# Patient Record
Sex: FEMALE | Race: WHITE | NOT HISPANIC OR LATINO | Employment: OTHER | URBAN - NONMETROPOLITAN AREA
[De-identification: names, ages, dates, MRNs, and addresses within clinical notes are randomized per-mention and may not be internally consistent; named-entity substitution may affect disease eponyms.]

---

## 2023-04-12 DIAGNOSIS — J06.9 VIRAL UPPER RESPIRATORY TRACT INFECTION: Primary | ICD-10-CM

## 2023-04-12 RX ORDER — PREDNISONE 20 MG/1
TABLET ORAL
COMMUNITY
End: 2023-04-12 | Stop reason: SDUPTHER

## 2023-04-12 RX ORDER — PREDNISONE 20 MG/1
TABLET ORAL
Qty: 18 TABLET | Refills: 0 | Status: SHIPPED | OUTPATIENT
Start: 2023-04-12 | End: 2023-06-23 | Stop reason: SDUPTHER

## 2023-04-12 RX ORDER — AZITHROMYCIN 500 MG/1
500 TABLET, FILM COATED ORAL DAILY
COMMUNITY
Start: 2022-10-22 | End: 2023-04-12 | Stop reason: SDUPTHER

## 2023-04-12 RX ORDER — AZITHROMYCIN 500 MG/1
500 TABLET, FILM COATED ORAL DAILY
Qty: 7 TABLET | Refills: 7 | Status: SHIPPED | OUTPATIENT
Start: 2023-04-12 | End: 2023-06-23 | Stop reason: SDUPTHER

## 2023-05-02 DIAGNOSIS — J43.2 CENTRILOBULAR EMPHYSEMA (MULTI): Primary | ICD-10-CM

## 2023-05-02 RX ORDER — ALBUTEROL SULFATE 90 UG/1
AEROSOL, METERED RESPIRATORY (INHALATION)
Qty: 18 G | Refills: 2 | Status: SHIPPED | OUTPATIENT
Start: 2023-05-02 | End: 2023-06-12 | Stop reason: SDUPTHER

## 2023-05-05 DIAGNOSIS — I10 PRIMARY HYPERTENSION: Primary | ICD-10-CM

## 2023-05-05 RX ORDER — METOPROLOL TARTRATE 50 MG/1
TABLET ORAL
Qty: 180 TABLET | Refills: 2 | Status: SHIPPED | OUTPATIENT
Start: 2023-05-05 | End: 2024-03-01 | Stop reason: ALTCHOICE

## 2023-06-07 DIAGNOSIS — J41.0 SIMPLE CHRONIC BRONCHITIS (MULTI): ICD-10-CM

## 2023-06-07 RX ORDER — FLUTICASONE FUROATE, UMECLIDINIUM BROMIDE AND VILANTEROL TRIFENATATE 100; 62.5; 25 UG/1; UG/1; UG/1
POWDER RESPIRATORY (INHALATION)
Qty: 28 EACH | Refills: 3 | Status: ON HOLD | OUTPATIENT
Start: 2023-06-07 | End: 2023-12-28 | Stop reason: ALTCHOICE

## 2023-06-12 ENCOUNTER — TELEPHONE (OUTPATIENT)
Dept: PRIMARY CARE | Facility: CLINIC | Age: 83
End: 2023-06-12
Payer: MEDICARE

## 2023-06-12 DIAGNOSIS — J43.2 CENTRILOBULAR EMPHYSEMA (MULTI): ICD-10-CM

## 2023-06-12 RX ORDER — ALBUTEROL SULFATE 90 UG/1
2 AEROSOL, METERED RESPIRATORY (INHALATION)
Qty: 54 G | Refills: 3 | Status: SHIPPED | OUTPATIENT
Start: 2023-06-12 | End: 2024-01-12

## 2023-06-12 NOTE — TELEPHONE ENCOUNTER
Bailey from [facility] pharmacy called for 90 day refill on Albuterol.  Currently only getting 16 days at a time. Thank You

## 2023-06-23 DIAGNOSIS — J43.2 CENTRILOBULAR EMPHYSEMA (MULTI): Primary | ICD-10-CM

## 2023-06-23 DIAGNOSIS — J06.9 VIRAL UPPER RESPIRATORY TRACT INFECTION: ICD-10-CM

## 2023-06-23 RX ORDER — PREDNISONE 20 MG/1
TABLET ORAL
Qty: 18 TABLET | Refills: 0 | Status: SHIPPED | OUTPATIENT
Start: 2023-06-23 | End: 2023-07-18 | Stop reason: ALTCHOICE

## 2023-06-23 RX ORDER — AZITHROMYCIN 500 MG/1
500 TABLET, FILM COATED ORAL DAILY
Qty: 7 TABLET | Refills: 7 | Status: SHIPPED | OUTPATIENT
Start: 2023-06-23 | End: 2023-07-18 | Stop reason: ALTCHOICE

## 2023-06-23 NOTE — PROGRESS NOTES
Pt called through answering service to say that her copd is acting up and requested antibiotic and steroid. Asked pt how short of breath she is and she said she couldn't walk more than 40 ft. Advised pt if that so would recommend ER. Pt declined. Sent meds but advised if worsens recommend ER.

## 2023-08-01 DIAGNOSIS — J43.2 CENTRILOBULAR EMPHYSEMA (MULTI): Primary | ICD-10-CM

## 2023-08-01 RX ORDER — BUDESONIDE AND FORMOTEROL FUMARATE DIHYDRATE 80; 4.5 UG/1; UG/1
2 AEROSOL RESPIRATORY (INHALATION)
Qty: 3 EACH | Refills: 11 | Status: SHIPPED | OUTPATIENT
Start: 2023-08-01

## 2023-08-10 ENCOUNTER — OFFICE VISIT (OUTPATIENT)
Dept: PRIMARY CARE | Facility: CLINIC | Age: 83
End: 2023-08-10
Payer: MEDICARE

## 2023-08-10 VITALS
HEIGHT: 62 IN | BODY MASS INDEX: 20.61 KG/M2 | HEART RATE: 76 BPM | WEIGHT: 112 LBS | DIASTOLIC BLOOD PRESSURE: 79 MMHG | SYSTOLIC BLOOD PRESSURE: 118 MMHG

## 2023-08-10 DIAGNOSIS — E21.0 PRIMARY HYPERPARATHYROIDISM (MULTI): ICD-10-CM

## 2023-08-10 DIAGNOSIS — R94.6 ABNORMAL THYROID FUNCTION TEST: ICD-10-CM

## 2023-08-10 DIAGNOSIS — G62.0 NEUROPATHY DUE TO CHEMOTHERAPEUTIC DRUG (MULTI): Primary | ICD-10-CM

## 2023-08-10 DIAGNOSIS — T45.1X5A NEUROPATHY DUE TO CHEMOTHERAPEUTIC DRUG (MULTI): Primary | ICD-10-CM

## 2023-08-10 DIAGNOSIS — J41.0 SIMPLE CHRONIC BRONCHITIS (MULTI): ICD-10-CM

## 2023-08-10 DIAGNOSIS — Z13.220 SCREENING FOR LIPID DISORDERS: ICD-10-CM

## 2023-08-10 DIAGNOSIS — G63 NEUROPATHY ASSOCIATED WITH CANCER (MULTI): ICD-10-CM

## 2023-08-10 DIAGNOSIS — E46 PROTEIN-CALORIE MALNUTRITION, UNSPECIFIED SEVERITY (MULTI): ICD-10-CM

## 2023-08-10 DIAGNOSIS — C80.1 NEUROPATHY ASSOCIATED WITH CANCER (MULTI): ICD-10-CM

## 2023-08-10 DIAGNOSIS — R53.83 OTHER FATIGUE: ICD-10-CM

## 2023-08-10 DIAGNOSIS — I73.9 PERIPHERAL VASCULAR DISEASE (CMS-HCC): ICD-10-CM

## 2023-08-10 PROBLEM — K21.9 GERD (GASTROESOPHAGEAL REFLUX DISEASE): Status: ACTIVE | Noted: 2023-08-10

## 2023-08-10 PROBLEM — R91.1 LUNG NODULE: Status: ACTIVE | Noted: 2023-08-10

## 2023-08-10 PROBLEM — M81.0 SENILE OSTEOPOROSIS: Status: ACTIVE | Noted: 2020-11-02

## 2023-08-10 PROBLEM — F41.9 ANXIETY: Status: ACTIVE | Noted: 2023-08-10

## 2023-08-10 PROCEDURE — 99214 OFFICE O/P EST MOD 30 MIN: CPT | Performed by: INTERNAL MEDICINE

## 2023-08-10 PROCEDURE — 1159F MED LIST DOCD IN RCRD: CPT | Performed by: INTERNAL MEDICINE

## 2023-08-10 PROCEDURE — 1160F RVW MEDS BY RX/DR IN RCRD: CPT | Performed by: INTERNAL MEDICINE

## 2023-08-10 PROCEDURE — 1036F TOBACCO NON-USER: CPT | Performed by: INTERNAL MEDICINE

## 2023-08-10 ASSESSMENT — ENCOUNTER SYMPTOMS
CHILLS: 0
SHORTNESS OF BREATH: 0
COUGH: 0
APPETITE CHANGE: 0
SORE THROAT: 0
WHEEZING: 0
WEAKNESS: 0
NAUSEA: 0
BACK PAIN: 0
FATIGUE: 0
ACTIVITY CHANGE: 0
NUMBNESS: 0
SINUS PAIN: 0
SINUS PRESSURE: 0
ABDOMINAL DISTENTION: 0
DIARRHEA: 0
VOMITING: 0

## 2023-08-10 ASSESSMENT — PATIENT HEALTH QUESTIONNAIRE - PHQ9
1. LITTLE INTEREST OR PLEASURE IN DOING THINGS: NOT AT ALL
SUM OF ALL RESPONSES TO PHQ9 QUESTIONS 1 AND 2: 0
2. FEELING DOWN, DEPRESSED OR HOPELESS: NOT AT ALL

## 2023-08-10 NOTE — PROGRESS NOTES
"Subjective   Patient ID: Melissa Goodwin is a 83 y.o. female who presents for Follow-up (6 month).  HPI  Patient is here today for 6 mo follow up   Patient had called through the answering service due to significant sob, had send in her medication but told her that if she was that short of breath she should go to the hospital, she had declined. She reports that she is feeling a little better.     Review of Systems   Constitutional:  Negative for activity change, appetite change, chills and fatigue.   HENT:  Negative for congestion, postnasal drip, sinus pressure, sinus pain and sore throat.    Respiratory:  Negative for cough, shortness of breath and wheezing.    Cardiovascular:  Negative for chest pain and leg swelling.   Gastrointestinal:  Negative for abdominal distention, diarrhea, nausea and vomiting.   Musculoskeletal:  Negative for back pain.   Neurological:  Negative for weakness and numbness.       Objective   /79 (BP Location: Right arm, Patient Position: Sitting, BP Cuff Size: Adult)   Pulse 76   Ht 1.575 m (5' 2\")   Wt 50.8 kg (112 lb)   BMI 20.49 kg/m²     Physical Exam  Constitutional:       General: She is not in acute distress.     Appearance: Normal appearance.   HENT:      Head: Normocephalic.      Nose: Nose normal.      Mouth/Throat:      Mouth: Mucous membranes are dry.      Pharynx: No oropharyngeal exudate.   Eyes:      General:         Right eye: No discharge.         Left eye: No discharge.      Extraocular Movements: Extraocular movements intact.      Pupils: Pupils are equal, round, and reactive to light.   Cardiovascular:      Rate and Rhythm: Normal rate and regular rhythm.      Heart sounds: No murmur heard.     No gallop.   Pulmonary:      Effort: Pulmonary effort is normal. No respiratory distress.      Breath sounds: Normal breath sounds. No wheezing.   Musculoskeletal:         General: No swelling. Normal range of motion.   Skin:     General: Skin is warm and dry.      " Coloration: Skin is not jaundiced.   Neurological:      General: No focal deficit present.      Mental Status: She is alert and oriented to person, place, and time.      Cranial Nerves: No cranial nerve deficit.   Psychiatric:         Mood and Affect: Mood normal.         Behavior: Behavior normal.           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 HTN, controlled   - continue hctz 25mg po daily   - continue metoprolol 50mg po daily      2 COPD, stable   - continue trelegy , advised her to hold the symbicort   albuterol as needed      3 . Osteoporosis   - on ibandronate      4. Colon cancer s/p resection  - she does not want q6 mo CT scans for surveillance   - no issues, not actively following with hemonc   - declines cea bloodwork     5. 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

## 2023-08-15 ENCOUNTER — LAB (OUTPATIENT)
Dept: LAB | Facility: LAB | Age: 83
End: 2023-08-15
Payer: MEDICARE

## 2023-08-15 DIAGNOSIS — Z13.220 SCREENING FOR LIPID DISORDERS: ICD-10-CM

## 2023-08-15 DIAGNOSIS — R94.6 ABNORMAL THYROID FUNCTION TEST: ICD-10-CM

## 2023-08-15 DIAGNOSIS — E21.0 PRIMARY HYPERPARATHYROIDISM (MULTI): ICD-10-CM

## 2023-08-15 DIAGNOSIS — R53.83 OTHER FATIGUE: ICD-10-CM

## 2023-08-15 LAB
ALANINE AMINOTRANSFERASE (SGPT) (U/L) IN SER/PLAS: 10 U/L (ref 7–45)
ALBUMIN (G/DL) IN SER/PLAS: 4.4 G/DL (ref 3.4–5)
ALKALINE PHOSPHATASE (U/L) IN SER/PLAS: 64 U/L (ref 33–136)
ANION GAP IN SER/PLAS: 11 MMOL/L (ref 10–20)
ASPARTATE AMINOTRANSFERASE (SGOT) (U/L) IN SER/PLAS: 25 U/L (ref 9–39)
BILIRUBIN TOTAL (MG/DL) IN SER/PLAS: 1.2 MG/DL (ref 0–1.2)
CALCIUM (MG/DL) IN SER/PLAS: 10.3 MG/DL (ref 8.6–10.3)
CARBON DIOXIDE, TOTAL (MMOL/L) IN SER/PLAS: 30 MMOL/L (ref 21–32)
CHLORIDE (MMOL/L) IN SER/PLAS: 101 MMOL/L (ref 98–107)
CHOLESTEROL (MG/DL) IN SER/PLAS: 98 MG/DL (ref 0–199)
CHOLESTEROL IN HDL (MG/DL) IN SER/PLAS: 72 MG/DL
CHOLESTEROL/HDL RATIO: 1.4
COBALAMIN (VITAMIN B12) (PG/ML) IN SER/PLAS: 389 PG/ML (ref 211–911)
CREATININE (MG/DL) IN SER/PLAS: 0.65 MG/DL (ref 0.5–1.05)
GFR FEMALE: 87 ML/MIN/1.73M2
GLUCOSE (MG/DL) IN SER/PLAS: 91 MG/DL (ref 74–99)
LDL: 19 MG/DL (ref 0–99)
POTASSIUM (MMOL/L) IN SER/PLAS: 4.2 MMOL/L (ref 3.5–5.3)
PROTEIN TOTAL: 6.4 G/DL (ref 6.4–8.2)
SODIUM (MMOL/L) IN SER/PLAS: 138 MMOL/L (ref 136–145)
THYROTROPIN (MIU/L) IN SER/PLAS BY DETECTION LIMIT <= 0.05 MIU/L: 4.57 MIU/L (ref 0.44–3.98)
THYROXINE (T4) FREE (NG/DL) IN SER/PLAS: 1.01 NG/DL (ref 0.61–1.12)
TRIGLYCERIDE (MG/DL) IN SER/PLAS: 33 MG/DL (ref 0–149)
UREA NITROGEN (MG/DL) IN SER/PLAS: 12 MG/DL (ref 6–23)
VLDL: 7 MG/DL (ref 0–40)

## 2023-08-15 PROCEDURE — 82607 VITAMIN B-12: CPT

## 2023-08-15 PROCEDURE — 80053 COMPREHEN METABOLIC PANEL: CPT

## 2023-08-15 PROCEDURE — 84439 ASSAY OF FREE THYROXINE: CPT

## 2023-08-15 PROCEDURE — 80061 LIPID PANEL: CPT

## 2023-08-15 PROCEDURE — 83970 ASSAY OF PARATHORMONE: CPT

## 2023-08-15 PROCEDURE — 36415 COLL VENOUS BLD VENIPUNCTURE: CPT

## 2023-08-15 PROCEDURE — 84443 ASSAY THYROID STIM HORMONE: CPT

## 2023-08-16 LAB — PARATHYRIN INTACT (PG/ML) IN SER/PLAS: 222.1 PG/ML (ref 18.5–88)

## 2023-08-20 DIAGNOSIS — M81.0 SENILE OSTEOPOROSIS: Primary | ICD-10-CM

## 2023-08-21 RX ORDER — IBANDRONATE SODIUM 150 MG/1
150 TABLET, FILM COATED ORAL
Qty: 3 TABLET | Refills: 1 | Status: SHIPPED | OUTPATIENT
Start: 2023-08-21 | End: 2024-03-01 | Stop reason: ALTCHOICE

## 2023-09-21 ENCOUNTER — OFFICE VISIT (OUTPATIENT)
Dept: PRIMARY CARE | Facility: CLINIC | Age: 83
End: 2023-09-21
Payer: MEDICARE

## 2023-09-21 VITALS
HEART RATE: 66 BPM | DIASTOLIC BLOOD PRESSURE: 80 MMHG | BODY MASS INDEX: 21.16 KG/M2 | WEIGHT: 115 LBS | HEIGHT: 62 IN | SYSTOLIC BLOOD PRESSURE: 127 MMHG

## 2023-09-21 DIAGNOSIS — Z01.818 PRE-OP EVALUATION: Primary | ICD-10-CM

## 2023-09-21 DIAGNOSIS — J41.0 SIMPLE CHRONIC BRONCHITIS (MULTI): ICD-10-CM

## 2023-09-21 PROCEDURE — 99214 OFFICE O/P EST MOD 30 MIN: CPT | Performed by: INTERNAL MEDICINE

## 2023-09-21 PROCEDURE — 1160F RVW MEDS BY RX/DR IN RCRD: CPT | Performed by: INTERNAL MEDICINE

## 2023-09-21 PROCEDURE — 1159F MED LIST DOCD IN RCRD: CPT | Performed by: INTERNAL MEDICINE

## 2023-09-21 PROCEDURE — 1036F TOBACCO NON-USER: CPT | Performed by: INTERNAL MEDICINE

## 2023-09-21 RX ORDER — BUDESONIDE, GLYCOPYRROLATE, AND FORMOTEROL FUMARATE 160; 9; 4.8 UG/1; UG/1; UG/1
2 AEROSOL, METERED RESPIRATORY (INHALATION)
Qty: 10.7 G | Refills: 0 | Status: SHIPPED | OUTPATIENT
Start: 2023-09-21 | End: 2023-10-11

## 2023-09-21 ASSESSMENT — ENCOUNTER SYMPTOMS
WEAKNESS: 0
SINUS PAIN: 0
ABDOMINAL DISTENTION: 0
FATIGUE: 0
SORE THROAT: 0
WHEEZING: 0
COUGH: 0
NAUSEA: 0
APPETITE CHANGE: 0
NUMBNESS: 0
BACK PAIN: 0
DIARRHEA: 0
SINUS PRESSURE: 0
CHILLS: 0
SHORTNESS OF BREATH: 0
VOMITING: 0
ACTIVITY CHANGE: 0

## 2023-09-21 NOTE — PROGRESS NOTES
"Subjective   Patient ID: Melissa Goodwin is a 83 y.o. female who presents for Pre-op Exam.  HPI  Patient is here today for pre-op clearance for left cataract surgery by Dr Pitts.   Patient has had surgeries in the past wiouth any issues with anesthesia.   Patient reports that she is feeling well.   Patient has greater than 4METS without sob or chest pain.   She had complete bloodwork back in August 23.     Review of Systems   Constitutional:  Negative for activity change, appetite change, chills and fatigue.   HENT:  Negative for congestion, postnasal drip, sinus pressure, sinus pain and sore throat.    Respiratory:  Negative for cough, shortness of breath and wheezing.    Cardiovascular:  Negative for chest pain and leg swelling.   Gastrointestinal:  Negative for abdominal distention, diarrhea, nausea and vomiting.   Musculoskeletal:  Negative for back pain.   Neurological:  Negative for weakness and numbness.       Objective   /80 (BP Location: Right arm, Patient Position: Sitting, BP Cuff Size: Adult)   Pulse 66   Ht 1.575 m (5' 2\")   Wt 52.2 kg (115 lb)   BMI 21.03 kg/m²     Physical Exam  Constitutional:       General: She is not in acute distress.     Appearance: Normal appearance. She is not toxic-appearing.   HENT:      Head: Normocephalic and atraumatic.      Nose: Nose normal.      Mouth/Throat:      Mouth: Mucous membranes are moist.      Pharynx: Oropharynx is clear.   Eyes:      Extraocular Movements: Extraocular movements intact.      Conjunctiva/sclera: Conjunctivae normal.      Pupils: Pupils are equal, round, and reactive to light.   Cardiovascular:      Rate and Rhythm: Normal rate and regular rhythm.      Heart sounds: No murmur heard.     No friction rub. No gallop.   Pulmonary:      Effort: Pulmonary effort is normal.      Breath sounds: Normal breath sounds.   Abdominal:      General: Bowel sounds are normal. There is no distension.      Palpations: Abdomen is soft. There is no mass. "      Tenderness: There is no abdominal tenderness. There is no guarding.   Musculoskeletal:         General: No swelling. Normal range of motion.      Cervical back: Normal range of motion.   Skin:     General: Skin is warm and dry.   Neurological:      General: No focal deficit present.      Mental Status: She is alert and oriented to person, place, and time.   Psychiatric:         Mood and Affect: Mood normal.         Thought Content: Thought content normal.         Judgment: Judgment normal.           Assessment/Plan   Problem List Items Addressed This Visit       Simple chronic bronchitis (CMS/HCC)    Relevant Medications    budesonide-glycopyr-formoterol (Breztri Aerosphere) 160-9-4.8 mcg/actuation HFA aerosol inhaler     Other Visit Diagnoses       Pre-op evaluation    -  Primary    Relevant Orders    ECG 12 Lead          Pre- op clearance   - cmp normal in 8.23    Will order EKG   - has had surgeries without issues on anesthesia   - can walk grater than 4 mets WO any chest pain or sob so no need for further cardiac testing   - copd has been stable without exacerbations in the last 6 mo   - addend when see results   ADDENDUM Reviewed pts EKG, NSR, right bundle branch block  PT is low risk and may proceed with planned cataract surgery     2. COPD   Wants to try breztri instead of trelegy, will see if its covered     Final diagnoses:   [Z01.818] Pre-op evaluation   [J41.0] Simple chronic bronchitis (CMS/HCC)

## 2023-10-05 ENCOUNTER — PREP FOR PROCEDURE (OUTPATIENT)
Dept: OPERATING ROOM | Facility: HOSPITAL | Age: 83
End: 2023-10-05
Payer: MEDICARE

## 2023-10-05 DIAGNOSIS — H25.812 COMBINED FORMS OF AGE-RELATED CATARACT OF LEFT EYE: Primary | ICD-10-CM

## 2023-10-05 RX ORDER — KETOROLAC TROMETHAMINE 5 MG/ML
1 SOLUTION OPHTHALMIC
Status: CANCELLED | OUTPATIENT
Start: 2023-10-12 | End: 2023-10-12

## 2023-10-05 RX ORDER — PREDNISOLONE ACETATE 10 MG/ML
1 SUSPENSION/ DROPS OPHTHALMIC ONCE
Status: DISCONTINUED | OUTPATIENT
Start: 2023-10-12 | End: 2023-10-05 | Stop reason: HOSPADM

## 2023-10-05 RX ORDER — POVIDONE-IODINE 5 %
1 SOLUTION, NON-ORAL OPHTHALMIC (EYE) ONCE
Status: CANCELLED | OUTPATIENT
Start: 2023-10-12

## 2023-10-05 RX ORDER — MOXIFLOXACIN 5 MG/ML
1 SOLUTION/ DROPS OPHTHALMIC ONCE
Status: DISCONTINUED | OUTPATIENT
Start: 2023-10-12 | End: 2023-10-05 | Stop reason: HOSPADM

## 2023-10-05 RX ORDER — TETRACAINE HYDROCHLORIDE 5 MG/ML
1 SOLUTION OPHTHALMIC ONCE
Status: CANCELLED | OUTPATIENT
Start: 2023-10-12

## 2023-10-10 ENCOUNTER — PREP FOR PROCEDURE (OUTPATIENT)
Dept: OPERATING ROOM | Facility: HOSPITAL | Age: 83
End: 2023-10-10
Payer: MEDICARE

## 2023-10-10 RX ORDER — MOXIFLOXACIN 5 MG/ML
1 SOLUTION/ DROPS OPHTHALMIC ONCE
Status: CANCELLED | OUTPATIENT
Start: 2023-10-12

## 2023-10-10 RX ORDER — MOXIFLOXACIN 5 MG/ML
1 SOLUTION/ DROPS OPHTHALMIC ONCE
Status: DISCONTINUED | OUTPATIENT
Start: 2023-10-12 | End: 2023-10-10 | Stop reason: ENTERED-IN-ERROR

## 2023-10-10 RX ORDER — PREDNISOLONE ACETATE 10 MG/ML
1 SUSPENSION/ DROPS OPHTHALMIC ONCE
Status: CANCELLED | OUTPATIENT
Start: 2023-10-12

## 2023-10-10 RX ORDER — PREDNISOLONE ACETATE 10 MG/ML
1 SUSPENSION/ DROPS OPHTHALMIC ONCE
Status: DISCONTINUED | OUTPATIENT
Start: 2023-10-12 | End: 2023-10-10 | Stop reason: ENTERED-IN-ERROR

## 2023-10-11 ENCOUNTER — PRE-ADMISSION TESTING (OUTPATIENT)
Dept: PREADMISSION TESTING | Facility: HOSPITAL | Age: 83
End: 2023-10-11
Payer: MEDICARE

## 2023-10-11 ENCOUNTER — ANESTHESIA EVENT (OUTPATIENT)
Dept: OPERATING ROOM | Facility: HOSPITAL | Age: 83
End: 2023-10-11
Payer: MEDICARE

## 2023-10-11 VITALS — BODY MASS INDEX: 20.85 KG/M2 | WEIGHT: 114 LBS

## 2023-10-12 ENCOUNTER — HOSPITAL ENCOUNTER (OUTPATIENT)
Facility: HOSPITAL | Age: 83
Setting detail: OUTPATIENT SURGERY
Discharge: HOME | End: 2023-10-12
Attending: OPHTHALMOLOGY | Admitting: OPHTHALMOLOGY
Payer: MEDICARE

## 2023-10-12 ENCOUNTER — ANESTHESIA (OUTPATIENT)
Dept: OPERATING ROOM | Facility: HOSPITAL | Age: 83
End: 2023-10-12
Payer: MEDICARE

## 2023-10-12 VITALS
HEART RATE: 60 BPM | HEIGHT: 62 IN | SYSTOLIC BLOOD PRESSURE: 138 MMHG | OXYGEN SATURATION: 95 % | TEMPERATURE: 98.9 F | WEIGHT: 114.64 LBS | DIASTOLIC BLOOD PRESSURE: 81 MMHG | RESPIRATION RATE: 16 BRPM | BODY MASS INDEX: 21.1 KG/M2

## 2023-10-12 DIAGNOSIS — H25.812 COMBINED FORMS OF AGE-RELATED CATARACT OF LEFT EYE: Primary | ICD-10-CM

## 2023-10-12 PROBLEM — I10 HTN (HYPERTENSION): Status: ACTIVE | Noted: 2023-10-12

## 2023-10-12 PROCEDURE — C1780 LENS, INTRAOCULAR (NEW TECH): HCPCS | Performed by: OPHTHALMOLOGY

## 2023-10-12 PROCEDURE — 2500000004 HC RX 250 GENERAL PHARMACY W/ HCPCS (ALT 636 FOR OP/ED): Performed by: ANESTHESIOLOGY

## 2023-10-12 PROCEDURE — 2500000001 HC RX 250 WO HCPCS SELF ADMINISTERED DRUGS (ALT 637 FOR MEDICARE OP): Performed by: OPHTHALMOLOGY

## 2023-10-12 PROCEDURE — 3700000001 HC GENERAL ANESTHESIA TIME - INITIAL BASE CHARGE: Performed by: OPHTHALMOLOGY

## 2023-10-12 PROCEDURE — 2580000001 HC RX 258 IV SOLUTIONS: Performed by: ANESTHESIOLOGY

## 2023-10-12 PROCEDURE — 2500000001 HC RX 250 WO HCPCS SELF ADMINISTERED DRUGS (ALT 637 FOR MEDICARE OP)

## 2023-10-12 PROCEDURE — 7100000009 HC PHASE TWO TIME - INITIAL BASE CHARGE: Performed by: OPHTHALMOLOGY

## 2023-10-12 PROCEDURE — 3600000008 HC OR TIME - EACH INCREMENTAL 1 MINUTE - PROCEDURE LEVEL THREE: Performed by: OPHTHALMOLOGY

## 2023-10-12 PROCEDURE — 3700000002 HC GENERAL ANESTHESIA TIME - EACH INCREMENTAL 1 MINUTE: Performed by: OPHTHALMOLOGY

## 2023-10-12 PROCEDURE — 7100000010 HC PHASE TWO TIME - EACH INCREMENTAL 1 MINUTE: Performed by: OPHTHALMOLOGY

## 2023-10-12 PROCEDURE — 2720000007 HC OR 272 NO HCPCS: Performed by: OPHTHALMOLOGY

## 2023-10-12 PROCEDURE — 3600000003 HC OR TIME - INITIAL BASE CHARGE - PROCEDURE LEVEL THREE: Performed by: OPHTHALMOLOGY

## 2023-10-12 PROCEDURE — 2760000001 HC OR 276 NO HCPCS: Performed by: OPHTHALMOLOGY

## 2023-10-12 PROCEDURE — 2500000004 HC RX 250 GENERAL PHARMACY W/ HCPCS (ALT 636 FOR OP/ED): Mod: JZ | Performed by: OPHTHALMOLOGY

## 2023-10-12 DEVICE — LENS, INTRAOCULAR, SN60WF 21.0: Type: IMPLANTABLE DEVICE | Site: EYE | Status: FUNCTIONAL

## 2023-10-12 RX ORDER — MIDAZOLAM HYDROCHLORIDE 1 MG/ML
1 INJECTION INTRAMUSCULAR; INTRAVENOUS ONCE
Status: COMPLETED | OUTPATIENT
Start: 2023-10-12 | End: 2023-10-12

## 2023-10-12 RX ORDER — POVIDONE-IODINE 5 %
1 SOLUTION, NON-ORAL OPHTHALMIC (EYE) ONCE
Status: COMPLETED | OUTPATIENT
Start: 2023-10-12 | End: 2023-10-12

## 2023-10-12 RX ORDER — TETRACAINE HYDROCHLORIDE 5 MG/ML
1 SOLUTION OPHTHALMIC ONCE
Status: COMPLETED | OUTPATIENT
Start: 2023-10-12 | End: 2023-10-12

## 2023-10-12 RX ORDER — KETOROLAC TROMETHAMINE 5 MG/ML
1 SOLUTION OPHTHALMIC
Status: COMPLETED | OUTPATIENT
Start: 2023-10-12 | End: 2023-10-12

## 2023-10-12 RX ORDER — ONDANSETRON HYDROCHLORIDE 2 MG/ML
4 INJECTION, SOLUTION INTRAVENOUS ONCE
Status: COMPLETED | OUTPATIENT
Start: 2023-10-12 | End: 2023-10-12

## 2023-10-12 RX ORDER — MIDAZOLAM HYDROCHLORIDE 1 MG/ML
INJECTION, SOLUTION INTRAMUSCULAR; INTRAVENOUS AS NEEDED
Status: DISCONTINUED | OUTPATIENT
Start: 2023-10-12 | End: 2023-10-12

## 2023-10-12 RX ORDER — SODIUM CHLORIDE, SODIUM LACTATE, POTASSIUM CHLORIDE, CALCIUM CHLORIDE 600; 310; 30; 20 MG/100ML; MG/100ML; MG/100ML; MG/100ML
75 INJECTION, SOLUTION INTRAVENOUS CONTINUOUS
Status: DISCONTINUED | OUTPATIENT
Start: 2023-10-12 | End: 2023-10-12 | Stop reason: HOSPADM

## 2023-10-12 RX ORDER — MOXIFLOXACIN 5 MG/ML
SOLUTION/ DROPS OPHTHALMIC AS NEEDED
Status: DISCONTINUED | OUTPATIENT
Start: 2023-10-12 | End: 2023-10-12 | Stop reason: HOSPADM

## 2023-10-12 RX ADMIN — TETRACAINE HYDROCHLORIDE 1 DROP: 5 SOLUTION OPHTHALMIC at 10:21

## 2023-10-12 RX ADMIN — MIDAZOLAM 0.5 MG: 1 INJECTION INTRAMUSCULAR; INTRAVENOUS at 12:08

## 2023-10-12 RX ADMIN — PHENYLEPHRINE HYDROCHLORIDE 1 DROP: 100 SOLUTION/ DROPS OPHTHALMIC at 10:35

## 2023-10-12 RX ADMIN — KETOROLAC TROMETHAMINE 1 DROP: 5 SOLUTION OPHTHALMIC at 10:22

## 2023-10-12 RX ADMIN — POVIDONE-IODINE 1 APPLICATION: 5 SOLUTION OPHTHALMIC at 10:23

## 2023-10-12 RX ADMIN — PHENYLEPHRINE HYDROCHLORIDE 1 DROP: 100 SOLUTION/ DROPS OPHTHALMIC at 10:40

## 2023-10-12 RX ADMIN — POLYVINYL ALCOHOL, POVIDONE 1 DROP: 14; 6 SOLUTION/ DROPS OPHTHALMIC at 10:40

## 2023-10-12 RX ADMIN — SODIUM CHLORIDE, POTASSIUM CHLORIDE, SODIUM LACTATE AND CALCIUM CHLORIDE 75 ML/HR: 600; 310; 30; 20 INJECTION, SOLUTION INTRAVENOUS at 10:23

## 2023-10-12 RX ADMIN — MIDAZOLAM 0.5 MG: 1 INJECTION INTRAMUSCULAR; INTRAVENOUS at 11:56

## 2023-10-12 RX ADMIN — KETOROLAC TROMETHAMINE 1 DROP: 5 SOLUTION OPHTHALMIC at 10:35

## 2023-10-12 RX ADMIN — PHENYLEPHRINE HYDROCHLORIDE 1 DROP: 100 SOLUTION/ DROPS OPHTHALMIC at 10:22

## 2023-10-12 RX ADMIN — ONDANSETRON 4 MG: 2 INJECTION INTRAMUSCULAR; INTRAVENOUS at 10:36

## 2023-10-12 RX ADMIN — KETOROLAC TROMETHAMINE 1 DROP: 5 SOLUTION OPHTHALMIC at 10:40

## 2023-10-12 RX ADMIN — KETOROLAC TROMETHAMINE 1 DROP: 5 SOLUTION OPHTHALMIC at 10:48

## 2023-10-12 RX ADMIN — POLYVINYL ALCOHOL, POVIDONE 1 DROP: 14; 6 SOLUTION/ DROPS OPHTHALMIC at 10:48

## 2023-10-12 RX ADMIN — MIDAZOLAM 0.5 MG: 1 INJECTION INTRAMUSCULAR; INTRAVENOUS at 12:03

## 2023-10-12 RX ADMIN — PHENYLEPHRINE HYDROCHLORIDE 1 DROP: 100 SOLUTION/ DROPS OPHTHALMIC at 10:48

## 2023-10-12 RX ADMIN — MIDAZOLAM HYDROCHLORIDE 1 MG: 1 INJECTION, SOLUTION INTRAMUSCULAR; INTRAVENOUS at 11:22

## 2023-10-12 RX ADMIN — MIDAZOLAM 0.5 MG: 1 INJECTION INTRAMUSCULAR; INTRAVENOUS at 11:51

## 2023-10-12 RX ADMIN — POLYVINYL ALCOHOL, POVIDONE 1 DROP: 14; 6 SOLUTION/ DROPS OPHTHALMIC at 10:22

## 2023-10-12 RX ADMIN — POLYVINYL ALCOHOL, POVIDONE 1 DROP: 14; 6 SOLUTION/ DROPS OPHTHALMIC at 10:35

## 2023-10-12 ASSESSMENT — PAIN SCALES - GENERAL
PAINLEVEL_OUTOF10: 0 - NO PAIN
PAIN_LEVEL: 0

## 2023-10-12 ASSESSMENT — PAIN - FUNCTIONAL ASSESSMENT
PAIN_FUNCTIONAL_ASSESSMENT: 0-10

## 2023-10-12 NOTE — H&P
Reason for Visit    Reason for Visit -  Reason Comments   Blurred Vision Left Eye     Difficulty Reading Left Eye     Glare       Encounter Details    Encounter Details  Date Type Department Care Team Description   09/27/2023 1:30 PM EDT Office Visit [facility] Ophthalmology   [address] [id number]   [phone]  Morgan Pitts MD   [address] [place] [address]   [phone] (Work)   [phone] (Fax)  Combined forms of age-related cataract of left eye (Primary Dx);  Vitelliform lesion of macula;  Pseudophakia of right eye;  Essential hypertension;  Chronic obstructive pulmonary disease, unspecified COPD type (HCC)     Social History  - documented as of this encounter  Social History  Tobacco Use Types Packs/Day Years Used Date   Smoking Tobacco: Former Cigarettes 1 40 Quit: 01/05/1999   Smokeless Tobacco: Never             Social History  Alcohol Use Standard Drinks/Week Comments   No 0 (1 standard drink = 0.6 oz pure alcohol)       Social History  Area Deprivation Index Answer Date Recorded   National Score (1-100), lower number is lower risk 61     State Score (1-10), lower number is lower risk 4     Data from: https://[email or web address]/. Last address used for calculation [address]        Social History  Sex and Gender Information Value Date Recorded   Sex Assigned at Birth Not on file     Gender Identity Not on file     Sexual Orientation Not on file       Last Filed Vital Signs  - documented in this encounter  Last Filed Vital Signs  Vital Sign Reading Time Taken Comments   Blood Pressure 127/80 09/27/2023 1:48 PM EDT     Pulse 66 09/27/2023 1:48 PM EDT     Temperature - -     Respiratory Rate - -     Oxygen Saturation - -     Inhaled Oxygen Concentration - -     Weight - -     Height - -     Body Mass Index - -       Progress Notes  - documented in this encounter  Morgan Pitts MD - 09/27/2023 1:59 PM EDT  Formatting of this note might be different from  the original.  ASSESSMENT/PLAN:  1. Combined forms of age-related cataract of left eye - ICD9: 366.19, ICD10: H25.812 (primary diagnosis)    Cataract Presurgical Documentation    Cataract: Left Eye    Current Visual Acuity  Right Eye Distance CC 20/40  Left Eye Distance CC 20/50      Visual Function: Melissa Goodwin states that the decline in vision from the cataract impedes her abilities as listed in the HPI, as well as other activities of daily living.    Melissa Goodwin has confirmed that she is no longer able to function adequately on a day-to-day basis because of her current visual condition.    Further, it is my medical opinion that the cataract is the primary cause, or at least a significantly contributory cause of her visual dysfunction. With uncomplicated cataract surgery and lens implantation, it is my expectation that her visual function and quality of life will improve, significantly.    The risks, benefits, alternatives, personnel and complications of cataract surgery with lens implantation were discussed with Melissa Goodwin in detail. She appeared to understand and asked that I proceed with plans for surgery.    Upon eye examination, patient was found to have a visually significant cataract Left Eye. Discussed cataract surgery with patient and different intraocular lens implant options with patient: basic monofocal intraocular lens implant, Toric intraocular lens implant, and presbyopia correction intraocular lens implant. In my medical opinion, based on medical history and ocular examination, cataract surgery with intraocular lens implant will correct patient's vision and improve quality of patient's daily living activities. Patient wishes to have traditional cataract surgery with basic intraocular lens Left Eye. Patient wishes to have cataract surgery with the option stated above. Patient understands that an intraocular lens implant does not necessarily replace the need for glasses. Patient understands  that it is impossible for the surgeon to inform him/her of every possible complication that may occur. The surgeon has answered all of the patient's questions. Patient understands that if he/she has a mature or dense cataract, pseudoexfoliation cataract, or history of use of Flomax, he/she may require the use of Maluyugin Ring and/or Vision Blue during surgery. Patient understands the risks, benefits, and alternatives to surgery.    Plan Cataract Surgery with Monofocal Intraocular Lens Implant Left Eye on 10/12/2023 at [facility].    PHYSICAL EXAM:    Vital Signs:  Blood pressure 127/80, pulse 66.    Respiratory:  Normal breath sounds, no wheezing.    CARD:  Normal heart sounds 1 & 2, normal sinus rhythm.    Start:  Systane Complete solution instill 1 drop 3 times daily Both Eyes.    Patient will need physical, EKG, and BMP before proceeding with cataract surgery.    2. Vitelliform lesion of macula - ICD9: 362.76, ICD10: H35.54    Both Eyes    3. Pseudophakia of right eye - ICD9: V43.1, ICD10: Z96.1    Intraocular lens implant in good position Right Eye.    4. Essential hypertension - ICD9: 401.9, ICD10: I10    Continue to monitor with primary care physician.    5. Chronic obstructive pulmonary disease, unspecified COPD type (HCC) - ICD9: 496, ICD10: J44.9    Continue to monitor with primary care physician.    Morgan Pitts MD  I have confirmed and edited as necessary the relevant ophthalmic history, review of systems, surgical history, and ophthalmological examination findings as obtained by the ophthalmic technical staff. I have seen and examined Melissa Goodwin. I have discussed the examination findings, diagnosis, and treatment options with Melissa Goodwin and/or her family. I have also reviewed and agree with the assessment and plan as stated above and agree with all its relevant components. I gave the patient the opportunity to ask questions about the findings, diagnosis, and  treatment options.  Electronically signed by Morgan Pitts MD at 09/27/2023 2:11 PM EDT   Plan of Treatment  - documented as of this encounter  Plan of Treatment - Upcoming Encounters  Upcoming Encounters  Date Type Department Care Team Description   10/12/2023 11:50 AM EDT Hospital Encounter   Morgan Pitts MD   [address] [place] [address]   [phone] (Work)   [phone] (Fax)  Combined form of age-related cataract, left eye [H25.812]   10/12/2023 11:50 AM EDT - 10/12/2023 12:05 PM EDT Surgery [address] [address]  Morgan Pitts MD   [address] [place] [address]   [phone] (Work)   [phone] (Fax)  SURGERY AT [facility]   10/13/2023 1:45 PM EDT Office Visit [facility] Ophthalmology   [address] [place] [address]   [phone]  Morgan Pitts MD   [address] [place] [address]   [phone] (Work)   [phone] (Fax)        Plan of Treatment - Scheduled Procedures  Scheduled Procedures  Name Priority Associated Diagnoses Date/Time   SURGERY AT [facility]   Combined form of age-related cataract, left eye  10/12/2023 11:50 AM EDT     Visit Diagnoses  - documented in this encounter  Visit Diagnoses  Diagnosis   Combined forms of age-related cataract of left eye - Primary   Other and combined forms of senile cataract    Vitelliform lesion of macula    Pseudophakia of right eye   Lens replaced by other means    Essential hypertension   Unspecified essential hypertension    Chronic obstructive pulmonary disease, unspecified COPD type (HCC)    Combined form of age-related cataract, left eye      Eye Exam    Eye Exam - Visual Acuity (Snellen - Linear)  Visual Acuity (Snellen - Linear)    Right eye Left eye   Dist cc 20/40 20/50   Near cc   J6     Eye Exam  Correction: Glasses     Eye Exam - Tonometry (Applanation, 1:58 PM)  Tonometry (Applanation, 1:58 PM)    Right eye Left eye   Pressure 16 16     Eye Exam -  Pupils  Pupils    Pupils Dark Light Shape APD   Right eye PERRL 4 2 Round None   Left eye PERRL 4 2 Round None     Eye Exam - Visual Fields (Counting fingers)  Visual Fields (Counting fingers)    Right eye Left eye     Full Full     Eye Exam - Extraocular Movement  Extraocular Movement    Right eye Left eye     Full Full     Eye Exam - Neuro/Psych  Neuro/Psych  Oriented x3: Yes   Mood/Affect: Normal     Eye Exam - External Exam  External Exam    Right eye Left eye   External Normal including orbits and preauricular lymph nodes Normal including orbits and preauricular lymph nodes     Eye Exam - Slit Lamp Exam  Slit Lamp Exam    Right eye Left eye   Lids/Lashes Normal lids/lashes/lacrimal glands/lacrimal drainage Normal lids/lashes/lacrimal glands/lacrimal drainage   Conjunctiva/Sclera White and quiet White and quiet   Cornea Punctate epithelial keratitis Punctate epithelial keratitis   Anterior Chamber Deep and quiet Deep and quiet   Iris Round and reactive Round and reactive   Lens Posterior chamber intraocular lens 3+ Nuclear sclerotic cataract, 3+ Posterior subcapsular cataract   Anterior Vitreous Vitreous floaters Vitreous floaters     Eye Exam - Fundus Exam  Fundus Exam    Right eye Left eye   Disc Normal Normal   C/D Ratio 0.2 0.2   Macula Vitelliform lesion Vitelliform lesion   Vessels Normal Normal   Periphery Normal Normal     Eye Exam - Wearing Rx  Wearing Rx    Sphere Cylinder Axis Add   Right eye -0.50 +1.00 168 +2.50   Left eye -0.25 +0.75 054 +2.50     Eye Exam  Type: PAL     Care Teams  - documented as of this encounter  Care Teams  Team Member Relationship Specialty Start Date End Date   Mindy Mayberry DO   NPI: [id number]   [address] [place] [address]   [phone] (Work)   [phone] (Fax)  PCP - General Internal Medicine 9/20/22

## 2023-10-12 NOTE — ANESTHESIA POSTPROCEDURE EVALUATION
Patient: Melissa Goodwin    Procedure Summary       Date: 10/12/23 Room / Location: [facility] OR 05 / Virtual [facility] OR    Anesthesia Start: 1149 Anesthesia Stop: 1218    Procedure: CATARACT EXTRACTION W/IOL IMPLANT L EYE (Left) Diagnosis:       Combined forms of age-related cataract of left eye      (Combined forms of age-related cataract of left eye [H25.812])    Surgeons: Morgan Pitts MD Responsible Provider: Willy Brito DO    Anesthesia Type: MAC ASA Status: 2            Anesthesia Type: MAC    Vitals Value Taken Time   /100 10/12/23 1221   Temp 98.8 10/12/23 1221   Pulse 64 10/12/23 1221   Resp 20 10/12/23 1221   SpO2 98 10/12/23 1221       Anesthesia Post Evaluation    Patient location during evaluation: bedside  Patient participation: complete - patient participated  Level of consciousness: awake  Pain score: 0  Pain management: adequate  Multimodal analgesia pain management approach  Airway patency: patent  Cardiovascular status: acceptable  Respiratory status: acceptable  Hydration status: acceptable       Tolerated procedure well.  Denies pain or nausea.  VSS.  Clear airway .  Thankful for care provided.  No notable events documented.

## 2023-10-12 NOTE — OP NOTE
CATARACT EXTRACTION W/IOL IMPLANT L EYE (L) Operative Note     Date: 10/12/2023  OR Location: [facility] OR    Name: Melissa Goodwin, : 1940, Age: 83 y.o., MRN: [id number], Sex: female    Diagnosis  Pre-op Diagnosis     * Combined forms of age-related cataract of left eye [H25.812] Post-op Diagnosis     * Combined forms of age-related cataract of left eye [H25.812]     Procedures    * CATARACT EXTRACTION W/IOL IMPLANT L EYE    Surgeons      * Morgan Pitts - Primary    Resident/Fellow/Other Assistant:  No surgical staff documented.    Procedure Summary  Anesthesia: Monitor Anesthesia Care  ASA: II  Anesthesia Staff: Anesthesiologist: Willy Brito DO  Estimated Blood Loss: None  Intra-op Medications:   Medication Name Total Dose   ketorolac (Acular) 0.5 % ophthalmic solution 1 drop 2 drop   lubricating eye drops ophthalmic solution 1 drop 2 drop   phenylephrine-tropicamide 10 %-1 % ophthalmic solution 1 drop 2 drop     Anesthesia Record        Intraprocedure I/O Totals          lactated Ringer's infusion 450.00 mL    Total Intake 450 mL     Specimen: No specimens collected     Staff:   Circulator: Catherine Valdovinos RN  Scrub Person: Jonny Sieavis      Implants:  Implants       Type Name Action Serial No.      Lens LENS, INTRAOCULAR, SN60WF 21.0 - [id number] - [id number] Implanted [id number]           Findings: Combined Form Age Related Cataract Left Eye    Indications: Melissa Goodwin is an 83 y.o. female who is having surgery for Combined forms of age-related cataract of left eye [H25.812]. Patient complains of decreased vision left eye, difficulty seeing for near and distance.  Difficulty seeing to read fine print with left eye.     The patient was seen in the preoperative area. The risks, benefits, complications, treatment options, non-operative alternatives, expected recovery and outcomes were discussed with the patient. The possibilities of reaction to medication, pulmonary aspiration, injury to surrounding  structures, bleeding, recurrent infection, the need for additional procedures, failure to diagnose a condition, and creating a complication requiring transfusion or operation were discussed with the patient. The patient concurred with the proposed plan, giving informed consent.  The site of surgery was properly noted/marked if necessary per policy. The patient has been actively warmed in preoperative area. Preoperative antibiotics are not indicated. Venous thrombosis prophylaxis are not indicated.    Procedure Details:     The patient was correctly identified in the preop area and the operative eye was marked with a marking pen. The operative eye was dilated in the preoperative area.  The patient was then taken to the operating room where timeout was performed before starting the procedure. Combined anesthesia  with intravenous sedation and topical tetracaine eyedrops were given the left eye. The operative eye was prepped and draped in the standard sterile ophthalmic fashion in  preparation for ophthalmic surgery.  A Jacob wire speculum was then inserted between the eyelids of the left eye and the operating microscope was placed over the left eye.  A paracentesis incision was made approximately 30° away from the planned surgical incision site with the help of MVR blade.  1% lidocaine MPF with Phenylephrine 1.5% PF was injected into the anterior chamber through the paracentesis incision. A near limbal clear corneal incision was fashioned in the temporal quadrant just outside the vascular arcade and Viscoat was injected into anterior chamber to firm the eye. A bent needle cystotome was used and Utrata forceps were utilized to create a continuous curvilinear capsulorrhexis.  BSS was injected beneath the anterior capsule to hydrodissect the nucleus from adjacent cortex and capsule.  The residual cortex were then aspirated with irrigation aspiration handpiece. The posterior capsule was then polished with the help of  soft irrigation-aspiration tip.  Provisc viscoelastic was then injected into the eye to reform the anterior chamber and to open the capsular bag.  The intraocular lens implant was taken from its sterile wrapping, inspected under the surgical microscope and found to be in good condition. The intraocular lens implant +21.0D was injected into the capsule bag. The Provisc was then aspirated from the anterior chamber and from behind the intraocular lens implant.  The anterior chamber was inflated with the help of BSS to moderate tension.  The edges of the surgical incision were then hydrated with the help of BSS.  Vigamox was then injected into the anterior chamber and into the capsule bag through the paracentesis incision. The surgical wound was then inspected and found to be watertight.  The wire speculum and drapes were then removed.  Pred Forte eyedrops, Acular eyedrops and Betadine 5% sterile ophthalmic solution were instilled in the conjunctival sac.     The patient tolerated the procedure well and was taken to recovery room in stable condition.    Complications:  None; patient tolerated the procedure well.      Condition: stable     Attending Attestation: I performed the procedure.    Morgan Pitts  Phone Number: [phone]

## 2023-10-12 NOTE — ANESTHESIA PREPROCEDURE EVALUATION
Patient: Melissa Goodwin    Procedure Information       Date/Time: 10/12/23 1040    Procedure: CATARACT EXTRACTION W/IOL IMPLANT L EYE (Left)    Location: [facility] OR  / [facility] OR    Surgeons: Morgan Pitts MD            Relevant Problems   Cardiovascular   (+) HTN (hypertension)   (+) Peripheral vascular disease (CMS/HCC)      Endocrine   (+) Primary hyperparathyroidism (CMS/HCC)      GI   (+) GERD (gastroesophageal reflux disease)      Neuro/Psych   (+) Anxiety      Pulmonary   (+) Simple chronic bronchitis (CMS/HCC)       Clinical information reviewed:    Allergies  Meds               NPO Detail:  NPO/Void Status  Carbonhydrate Drink Given Prior to Surgery? : N  Date of Last Liquid: 10/12/23  Time of Last Liquid: 0000 (with am meds)  Date of Last Solid: 10/11/23  Time of Last Solid: 2300  Last Intake Type: Clear fluids  Time of Last Void: 0800         Physical Exam    Airway  Mallampati: II  TM distance: >3 FB     Cardiovascular   Rhythm: regular  Rate: normal     Dental   Comments: Multiple missing and broken teeth. None loose at present per patient.   Pulmonary   Breath sounds clear to auscultation     Abdominal            Anesthesia Plan    ASA 2     MAC     intravenous induction   Anesthetic plan and risks discussed with patient.    MAC discussed with patient.  Plan and process discussed for procedure.  Topical technique discussed.  All questions answered with patient.  Need for cooperation with surgeon discussed.

## 2023-11-03 DIAGNOSIS — J41.0 SIMPLE CHRONIC BRONCHITIS (MULTI): Primary | ICD-10-CM

## 2023-11-03 DIAGNOSIS — J43.2 CENTRILOBULAR EMPHYSEMA (MULTI): ICD-10-CM

## 2023-11-06 RX ORDER — IPRATROPIUM BROMIDE AND ALBUTEROL 20; 100 UG/1; UG/1
1 SPRAY, METERED RESPIRATORY (INHALATION) 4 TIMES DAILY
Qty: 12 G | Refills: 10 | Status: SHIPPED | OUTPATIENT
Start: 2023-11-06

## 2023-11-09 DIAGNOSIS — J41.0 SIMPLE CHRONIC BRONCHITIS (MULTI): Primary | ICD-10-CM

## 2023-11-09 RX ORDER — IPRATROPIUM BROMIDE AND ALBUTEROL SULFATE 2.5; .5 MG/3ML; MG/3ML
SOLUTION RESPIRATORY (INHALATION)
Qty: 180 ML | Refills: 10 | Status: SHIPPED | OUTPATIENT
Start: 2023-11-09 | End: 2024-01-17

## 2023-11-20 DIAGNOSIS — J44.1 COPD EXACERBATION (MULTI): ICD-10-CM

## 2023-11-20 RX ORDER — PREDNISONE 20 MG/1
TABLET ORAL
Qty: 18 TABLET | Refills: 0 | Status: SHIPPED | OUTPATIENT
Start: 2023-11-20 | End: 2023-12-28 | Stop reason: HOSPADM

## 2023-12-18 ENCOUNTER — TELEPHONE (OUTPATIENT)
Dept: PRIMARY CARE | Facility: CLINIC | Age: 83
End: 2023-12-18
Payer: MEDICARE

## 2023-12-18 DIAGNOSIS — J41.0 SIMPLE CHRONIC BRONCHITIS (MULTI): Primary | ICD-10-CM

## 2023-12-18 RX ORDER — PREDNISONE 20 MG/1
TABLET ORAL
Qty: 18 TABLET | Refills: 0 | Status: SHIPPED | OUTPATIENT
Start: 2023-12-18 | End: 2023-12-28 | Stop reason: HOSPADM

## 2023-12-18 NOTE — TELEPHONE ENCOUNTER
Patients daughter Georgina called; she is having a hard time with her COPD due to the weather change and is requesting a steroid. Please advise.   [phone]

## 2023-12-23 DIAGNOSIS — I10 PRIMARY HYPERTENSION: Primary | ICD-10-CM

## 2023-12-26 ENCOUNTER — TELEPHONE (OUTPATIENT)
Dept: PRIMARY CARE | Facility: CLINIC | Age: 83
End: 2023-12-26
Payer: MEDICARE

## 2023-12-26 RX ORDER — HYDROCHLOROTHIAZIDE 25 MG/1
TABLET ORAL
Qty: 90 TABLET | Refills: 3 | Status: SHIPPED | OUTPATIENT
Start: 2023-12-26 | End: 2024-03-01 | Stop reason: ALTCHOICE

## 2023-12-27 ENCOUNTER — APPOINTMENT (OUTPATIENT)
Dept: RADIOLOGY | Facility: HOSPITAL | Age: 83
DRG: 280 | End: 2023-12-27
Payer: MEDICARE

## 2023-12-27 ENCOUNTER — HOSPITAL ENCOUNTER (INPATIENT)
Facility: HOSPITAL | Age: 83
LOS: 1 days | Discharge: HOSPICE | DRG: 280 | End: 2023-12-28
Attending: EMERGENCY MEDICINE | Admitting: HOSPITALIST
Payer: MEDICARE

## 2023-12-27 DIAGNOSIS — I50.33 CHF (CONGESTIVE HEART FAILURE), NYHA CLASS I, ACUTE ON CHRONIC, DIASTOLIC: ICD-10-CM

## 2023-12-27 DIAGNOSIS — R09.02 HYPOXIA: ICD-10-CM

## 2023-12-27 DIAGNOSIS — I21.4 NSTEMI (NON-ST ELEVATED MYOCARDIAL INFARCTION) (MULTI): ICD-10-CM

## 2023-12-27 DIAGNOSIS — J18.9 PNEUMONIA DUE TO INFECTIOUS ORGANISM, UNSPECIFIED LATERALITY, UNSPECIFIED PART OF LUNG: ICD-10-CM

## 2023-12-27 DIAGNOSIS — R91.1 PULMONARY NODULE, RIGHT: ICD-10-CM

## 2023-12-27 DIAGNOSIS — J44.1 COPD EXACERBATION (MULTI): Primary | ICD-10-CM

## 2023-12-27 DIAGNOSIS — I10 PRIMARY HYPERTENSION: ICD-10-CM

## 2023-12-27 DIAGNOSIS — R79.89 ELEVATED BRAIN NATRIURETIC PEPTIDE (BNP) LEVEL: ICD-10-CM

## 2023-12-27 LAB
ALBUMIN SERPL BCP-MCNC: 3.9 G/DL (ref 3.4–5)
ALP SERPL-CCNC: 104 U/L (ref 33–136)
ALT SERPL W P-5'-P-CCNC: 23 U/L (ref 7–45)
ANION GAP SERPL CALC-SCNC: 13 MMOL/L (ref 10–20)
APTT PPP: 22 SECONDS (ref 27–38)
AST SERPL W P-5'-P-CCNC: 31 U/L (ref 9–39)
BASE EXCESS BLDA CALC-SCNC: 11.5 MMOL/L (ref -2–3)
BASOPHILS # BLD AUTO: 0.05 X10*3/UL (ref 0–0.1)
BASOPHILS NFR BLD AUTO: 0.4 %
BILIRUB SERPL-MCNC: 1.8 MG/DL (ref 0–1.2)
BNP SERPL-MCNC: 2894 PG/ML (ref 0–99)
BODY TEMPERATURE: 37 DEGREES CELSIUS
BUN SERPL-MCNC: 27 MG/DL (ref 6–23)
CALCIUM SERPL-MCNC: 10.8 MG/DL (ref 8.6–10.3)
CARDIAC TROPONIN I PNL SERPL HS: 1039 NG/L (ref 0–13)
CARDIAC TROPONIN I PNL SERPL HS: 1058 NG/L (ref 0–13)
CARDIAC TROPONIN I PNL SERPL HS: 814 NG/L (ref 0–13)
CHLORIDE SERPL-SCNC: 91 MMOL/L (ref 98–107)
CO2 SERPL-SCNC: 33 MMOL/L (ref 21–32)
CREAT SERPL-MCNC: 0.69 MG/DL (ref 0.5–1.05)
D DIMER PPP FEU-MCNC: 2269 NG/ML FEU
EOSINOPHIL # BLD AUTO: 0.01 X10*3/UL (ref 0–0.4)
EOSINOPHIL NFR BLD AUTO: 0.1 %
ERYTHROCYTE [DISTWIDTH] IN BLOOD BY AUTOMATED COUNT: 14 % (ref 11.5–14.5)
FLUAV RNA RESP QL NAA+PROBE: NOT DETECTED
FLUBV RNA RESP QL NAA+PROBE: NOT DETECTED
GFR SERPL CREATININE-BSD FRML MDRD: 86 ML/MIN/1.73M*2
GLUCOSE SERPL-MCNC: 149 MG/DL (ref 74–99)
HCO3 BLDA-SCNC: 39 MMOL/L (ref 22–26)
HCT VFR BLD AUTO: 55.3 % (ref 36–46)
HGB BLD-MCNC: 18.1 G/DL (ref 12–16)
HOLD SPECIMEN: NORMAL
HOLD SPECIMEN: NORMAL
IMM GRANULOCYTES # BLD AUTO: 0.06 X10*3/UL (ref 0–0.5)
IMM GRANULOCYTES NFR BLD AUTO: 0.5 % (ref 0–0.9)
INHALED O2 CONCENTRATION: 35 %
INR PPP: 1.2 (ref 0.9–1.1)
LACTATE SERPL-SCNC: 1.6 MMOL/L (ref 0.4–2)
LYMPHOCYTES # BLD AUTO: 0.68 X10*3/UL (ref 0.8–3)
LYMPHOCYTES NFR BLD AUTO: 5.7 %
MAGNESIUM SERPL-MCNC: 2.19 MG/DL (ref 1.6–2.4)
MCH RBC QN AUTO: 30.5 PG (ref 26–34)
MCHC RBC AUTO-ENTMCNC: 32.7 G/DL (ref 32–36)
MCV RBC AUTO: 93 FL (ref 80–100)
MONOCYTES # BLD AUTO: 1.45 X10*3/UL (ref 0.05–0.8)
MONOCYTES NFR BLD AUTO: 12.1 %
NEUTROPHILS # BLD AUTO: 9.77 X10*3/UL (ref 1.6–5.5)
NEUTROPHILS NFR BLD AUTO: 81.2 %
NRBC BLD-RTO: 0 /100 WBCS (ref 0–0)
OXYHGB MFR BLDA: 91.3 % (ref 94–98)
PCO2 BLDA: 63 MM HG (ref 38–42)
PH BLDA: 7.4 PH (ref 7.38–7.42)
PLATELET # BLD AUTO: 241 X10*3/UL (ref 150–450)
PO2 BLDA: 72 MM HG (ref 85–95)
POTASSIUM SERPL-SCNC: 4.3 MMOL/L (ref 3.5–5.3)
PROT SERPL-MCNC: 6.8 G/DL (ref 6.4–8.2)
PROTHROMBIN TIME: 13.3 SECONDS (ref 9.8–12.8)
RBC # BLD AUTO: 5.94 X10*6/UL (ref 4–5.2)
SAO2 % BLDA: 95 % (ref 94–100)
SARS-COV-2 RNA RESP QL NAA+PROBE: NOT DETECTED
SODIUM SERPL-SCNC: 133 MMOL/L (ref 136–145)
UFH PPP CHRO-ACNC: <0.1 IU/ML
WBC # BLD AUTO: 12 X10*3/UL (ref 4.4–11.3)

## 2023-12-27 PROCEDURE — 2550000001 HC RX 255 CONTRASTS: Performed by: EMERGENCY MEDICINE

## 2023-12-27 PROCEDURE — 71045 X-RAY EXAM CHEST 1 VIEW: CPT | Performed by: RADIOLOGY

## 2023-12-27 PROCEDURE — 71045 X-RAY EXAM CHEST 1 VIEW: CPT

## 2023-12-27 PROCEDURE — 83735 ASSAY OF MAGNESIUM: CPT | Performed by: PHYSICIAN ASSISTANT

## 2023-12-27 PROCEDURE — 94760 N-INVAS EAR/PLS OXIMETRY 1: CPT

## 2023-12-27 PROCEDURE — 2500000002 HC RX 250 W HCPCS SELF ADMINISTERED DRUGS (ALT 637 FOR MEDICARE OP, ALT 636 FOR OP/ED): Performed by: PHYSICIAN ASSISTANT

## 2023-12-27 PROCEDURE — 85025 COMPLETE CBC W/AUTO DIFF WBC: CPT | Performed by: PHYSICIAN ASSISTANT

## 2023-12-27 PROCEDURE — 85520 HEPARIN ASSAY: CPT | Performed by: EMERGENCY MEDICINE

## 2023-12-27 PROCEDURE — 36415 COLL VENOUS BLD VENIPUNCTURE: CPT | Performed by: PHYSICIAN ASSISTANT

## 2023-12-27 PROCEDURE — 71275 CT ANGIOGRAPHY CHEST: CPT | Performed by: RADIOLOGY

## 2023-12-27 PROCEDURE — 96375 TX/PRO/DX INJ NEW DRUG ADDON: CPT

## 2023-12-27 PROCEDURE — 84484 ASSAY OF TROPONIN QUANT: CPT | Performed by: PHYSICIAN ASSISTANT

## 2023-12-27 PROCEDURE — 94664 DEMO&/EVAL PT USE INHALER: CPT

## 2023-12-27 PROCEDURE — 94640 AIRWAY INHALATION TREATMENT: CPT

## 2023-12-27 PROCEDURE — 36600 WITHDRAWAL OF ARTERIAL BLOOD: CPT

## 2023-12-27 PROCEDURE — 9420000001 HC RT PATIENT EDUCATION 5 MIN

## 2023-12-27 PROCEDURE — 85610 PROTHROMBIN TIME: CPT | Performed by: PHYSICIAN ASSISTANT

## 2023-12-27 PROCEDURE — 85379 FIBRIN DEGRADATION QUANT: CPT | Performed by: PHYSICIAN ASSISTANT

## 2023-12-27 PROCEDURE — 2500000005 HC RX 250 GENERAL PHARMACY W/O HCPCS: Performed by: PHYSICIAN ASSISTANT

## 2023-12-27 PROCEDURE — 83605 ASSAY OF LACTIC ACID: CPT | Performed by: PHYSICIAN ASSISTANT

## 2023-12-27 PROCEDURE — 82805 BLOOD GASES W/O2 SATURATION: CPT | Performed by: PHYSICIAN ASSISTANT

## 2023-12-27 PROCEDURE — 2500000004 HC RX 250 GENERAL PHARMACY W/ HCPCS (ALT 636 FOR OP/ED): Performed by: PHYSICIAN ASSISTANT

## 2023-12-27 PROCEDURE — 83880 ASSAY OF NATRIURETIC PEPTIDE: CPT | Performed by: PHYSICIAN ASSISTANT

## 2023-12-27 PROCEDURE — 87075 CULTR BACTERIA EXCEPT BLOOD: CPT | Mod: SAMLAB | Performed by: PHYSICIAN ASSISTANT

## 2023-12-27 PROCEDURE — 96367 TX/PROPH/DG ADDL SEQ IV INF: CPT

## 2023-12-27 PROCEDURE — 80053 COMPREHEN METABOLIC PANEL: CPT | Performed by: PHYSICIAN ASSISTANT

## 2023-12-27 PROCEDURE — 96365 THER/PROPH/DIAG IV INF INIT: CPT | Mod: 59

## 2023-12-27 PROCEDURE — 2500000002 HC RX 250 W HCPCS SELF ADMINISTERED DRUGS (ALT 637 FOR MEDICARE OP, ALT 636 FOR OP/ED)

## 2023-12-27 PROCEDURE — 96361 HYDRATE IV INFUSION ADD-ON: CPT

## 2023-12-27 PROCEDURE — 96376 TX/PRO/DX INJ SAME DRUG ADON: CPT

## 2023-12-27 PROCEDURE — 71275 CT ANGIOGRAPHY CHEST: CPT

## 2023-12-27 PROCEDURE — 85730 THROMBOPLASTIN TIME PARTIAL: CPT | Performed by: PHYSICIAN ASSISTANT

## 2023-12-27 PROCEDURE — 93005 ELECTROCARDIOGRAM TRACING: CPT

## 2023-12-27 PROCEDURE — 99291 CRITICAL CARE FIRST HOUR: CPT | Performed by: PHYSICIAN ASSISTANT

## 2023-12-27 PROCEDURE — 96374 THER/PROPH/DIAG INJ IV PUSH: CPT

## 2023-12-27 PROCEDURE — 87636 SARSCOV2 & INF A&B AMP PRB: CPT | Performed by: PHYSICIAN ASSISTANT

## 2023-12-27 RX ORDER — CEFTRIAXONE 2 G/50ML
2 INJECTION, SOLUTION INTRAVENOUS ONCE
Status: COMPLETED | OUTPATIENT
Start: 2023-12-27 | End: 2023-12-27

## 2023-12-27 RX ORDER — IPRATROPIUM BROMIDE AND ALBUTEROL SULFATE 2.5; .5 MG/3ML; MG/3ML
3 SOLUTION RESPIRATORY (INHALATION) ONCE
Status: COMPLETED | OUTPATIENT
Start: 2023-12-27 | End: 2023-12-27

## 2023-12-27 RX ORDER — FUROSEMIDE 10 MG/ML
20 INJECTION INTRAMUSCULAR; INTRAVENOUS ONCE
Status: COMPLETED | OUTPATIENT
Start: 2023-12-27 | End: 2023-12-27

## 2023-12-27 RX ORDER — HEPARIN SODIUM 5000 [USP'U]/ML
60 INJECTION, SOLUTION INTRAVENOUS; SUBCUTANEOUS ONCE
Status: COMPLETED | OUTPATIENT
Start: 2023-12-27 | End: 2023-12-27

## 2023-12-27 RX ORDER — IPRATROPIUM BROMIDE AND ALBUTEROL SULFATE 2.5; .5 MG/3ML; MG/3ML
SOLUTION RESPIRATORY (INHALATION)
Status: COMPLETED
Start: 2023-12-27 | End: 2023-12-27

## 2023-12-27 RX ORDER — HEPARIN SODIUM 10000 [USP'U]/100ML
0-4000 INJECTION, SOLUTION INTRAVENOUS CONTINUOUS
Status: DISCONTINUED | OUTPATIENT
Start: 2023-12-27 | End: 2023-12-28 | Stop reason: HOSPADM

## 2023-12-27 RX ORDER — HEPARIN SODIUM 5000 [USP'U]/ML
INJECTION, SOLUTION INTRAVENOUS; SUBCUTANEOUS EVERY 4 HOURS PRN
Status: DISCONTINUED | OUTPATIENT
Start: 2023-12-27 | End: 2023-12-28 | Stop reason: HOSPADM

## 2023-12-27 RX ADMIN — FUROSEMIDE 20 MG: 10 INJECTION, SOLUTION INTRAMUSCULAR; INTRAVENOUS at 22:07

## 2023-12-27 RX ADMIN — HEPARIN SODIUM 590 UNITS/HR: 10000 INJECTION, SOLUTION INTRAVENOUS at 17:49

## 2023-12-27 RX ADMIN — IPRATROPIUM BROMIDE AND ALBUTEROL SULFATE 3 ML: 2.5; .5 SOLUTION RESPIRATORY (INHALATION) at 13:21

## 2023-12-27 RX ADMIN — METHYLPREDNISOLONE SODIUM SUCCINATE 125 MG: 125 INJECTION INTRAMUSCULAR; INTRAVENOUS at 15:05

## 2023-12-27 RX ADMIN — HEPARIN SODIUM 2950 UNITS: 5000 INJECTION INTRAVENOUS; SUBCUTANEOUS at 17:49

## 2023-12-27 RX ADMIN — IOHEXOL 68 ML: 350 INJECTION, SOLUTION INTRAVENOUS at 14:55

## 2023-12-27 RX ADMIN — Medication 35 PERCENT: at 13:12

## 2023-12-27 RX ADMIN — HEPARIN SODIUM 3000 UNITS: 5000 INJECTION INTRAVENOUS; SUBCUTANEOUS at 22:19

## 2023-12-27 RX ADMIN — AZITHROMYCIN MONOHYDRATE 500 MG: 500 INJECTION, POWDER, LYOPHILIZED, FOR SOLUTION INTRAVENOUS at 18:31

## 2023-12-27 RX ADMIN — SODIUM CHLORIDE 1000 ML: 9 INJECTION, SOLUTION INTRAVENOUS at 15:06

## 2023-12-27 RX ADMIN — IPRATROPIUM BROMIDE AND ALBUTEROL SULFATE 3 ML: 2.5; .5 SOLUTION RESPIRATORY (INHALATION) at 13:38

## 2023-12-27 RX ADMIN — CEFTRIAXONE SODIUM 2 G: 2 INJECTION, SOLUTION INTRAVENOUS at 18:00

## 2023-12-27 ASSESSMENT — ENCOUNTER SYMPTOMS
STRIDOR: 0
ARTHRALGIAS: 0
SHORTNESS OF BREATH: 1
COUGH: 1
FEVER: 0
BACK PAIN: 0
WHEEZING: 0
DYSURIA: 0
VOMITING: 0
COLOR CHANGE: 0
CHILLS: 0
EYE PAIN: 0
SEIZURES: 0
HEMATURIA: 0
SORE THROAT: 0
ABDOMINAL PAIN: 0
PALPITATIONS: 0

## 2023-12-27 ASSESSMENT — COLUMBIA-SUICIDE SEVERITY RATING SCALE - C-SSRS
6. HAVE YOU EVER DONE ANYTHING, STARTED TO DO ANYTHING, OR PREPARED TO DO ANYTHING TO END YOUR LIFE?: NO
1. IN THE PAST MONTH, HAVE YOU WISHED YOU WERE DEAD OR WISHED YOU COULD GO TO SLEEP AND NOT WAKE UP?: NO
2. HAVE YOU ACTUALLY HAD ANY THOUGHTS OF KILLING YOURSELF?: NO

## 2023-12-27 ASSESSMENT — PAIN - FUNCTIONAL ASSESSMENT: PAIN_FUNCTIONAL_ASSESSMENT: 0-10

## 2023-12-27 ASSESSMENT — PAIN SCALES - GENERAL: PAINLEVEL_OUTOF10: 0 - NO PAIN

## 2023-12-27 NOTE — ED PROVIDER NOTES
Patient is an 83-year-old female with a history of COPD who presents to the emergency department with a chief complaint of shortness of breath.  She states that she has had shortness of breath for approximately 1 week.  She reports that approximately 2 to 3 days ago she developed a productive cough.  She denies any chest pain.  Patient is chronically on 3-4 L of oxygen via nasal cannula PRN per the patient.  Patient reports that she has been wearing her oxygen continuously since she has had increased shortness of breath. No additional symptoms. No fever. She states that she takes steroids as needed.           Review of Systems   Constitutional:  Negative for chills and fever.   HENT:  Negative for ear pain and sore throat.    Eyes:  Negative for pain and visual disturbance.   Respiratory:  Positive for cough and shortness of breath. Negative for wheezing and stridor.    Cardiovascular:  Negative for chest pain and palpitations.   Gastrointestinal:  Negative for abdominal pain and vomiting.   Genitourinary:  Negative for dysuria and hematuria.   Musculoskeletal:  Negative for arthralgias and back pain.   Skin:  Negative for color change and rash.   Neurological:  Negative for seizures and syncope.   All other systems reviewed and are negative.       Physical Exam  Vitals and nursing note reviewed.   Constitutional:       General: She is not in acute distress.     Appearance: She is well-developed.   HENT:      Head: Normocephalic and atraumatic.   Eyes:      Conjunctiva/sclera: Conjunctivae normal.   Cardiovascular:      Rate and Rhythm: Normal rate and regular rhythm.      Heart sounds: No murmur heard.  Pulmonary:      Effort: Pulmonary effort is normal. No respiratory distress.      Breath sounds: Normal breath sounds.   Abdominal:      Palpations: Abdomen is soft.      Tenderness: There is no abdominal tenderness.   Musculoskeletal:         General: No swelling.      Cervical back: Neck supple.   Skin:      General: Skin is warm and dry.      Capillary Refill: Capillary refill takes less than 2 seconds.   Neurological:      Mental Status: She is alert.   Psychiatric:         Mood and Affect: Mood normal.          Labs Reviewed   CBC WITH AUTO DIFFERENTIAL - Abnormal       Result Value    WBC 12.0 (*)     nRBC 0.0      RBC 5.94 (*)     Hemoglobin 18.1 (*)     Hematocrit 55.3 (*)     MCV 93      MCH 30.5      MCHC 32.7      RDW 14.0      Platelets 241      Neutrophils % 81.2      Immature Granulocytes %, Automated 0.5      Lymphocytes % 5.7      Monocytes % 12.1      Eosinophils % 0.1      Basophils % 0.4      Neutrophils Absolute 9.77 (*)     Immature Granulocytes Absolute, Automated 0.06      Lymphocytes Absolute 0.68 (*)     Monocytes Absolute 1.45 (*)     Eosinophils Absolute 0.01      Basophils Absolute 0.05     COMPREHENSIVE METABOLIC PANEL - Abnormal    Glucose 149 (*)     Sodium 133 (*)     Potassium 4.3      Chloride 91 (*)     Bicarbonate 33 (*)     Anion Gap 13      Urea Nitrogen 27 (*)     Creatinine 0.69      eGFR 86      Calcium 10.8 (*)     Albumin 3.9      Alkaline Phosphatase 104      Total Protein 6.8      AST 31      Bilirubin, Total 1.8 (*)     ALT 23     APTT - Abnormal    aPTT 22 (*)     Narrative:     The APTT is no longer used for monitoring Unfractionated Heparin Therapy. For monitoring Heparin Therapy, use the Heparin Assay.   PROTIME-INR - Abnormal    Protime 13.3 (*)     INR 1.2 (*)    BLOOD GAS ARTERIAL - Abnormal    POCT pH, Arterial 7.40      POCT pCO2, Arterial 63 (*)     POCT pO2, Arterial 72 (*)     POCT SO2, Arterial 95      POCT Oxy Hemoglobin, Arterial 91.3 (*)     POCT Base Excess, Arterial 11.5 (*)     POCT HCO3 Calculated, Arterial 39.0 (*)     Patient Temperature 37.0      FiO2 35     D-DIMER, VTE EXCLUSION - Abnormal    D-Dimer, Quantitative VTE Exclusion 2,269 (*)     Narrative:     The VTE Exclusion D-Dimer assay is reported in ng/mL Fibrinogen Equivalent Units (FEU).    Per  's instructions for use, a value of less than 500 ng/mL (FEU) may help to exclude DVT or PE in outpatients when the assay is used with a clinical pretest probability assessment.([facility] must utilize and document eCalc 'Wells Score Deep Vein Thrombosis Risk' for DVT exclusion only. Emergency Department should utilize  Guidelines for Emergency Department Use of the VTE Exclusion D-Dimer and Clinical Pretest probability assessment model for DVT or PE exclusion.)   SERIAL TROPONIN-INITIAL - Abnormal    Troponin I, High Sensitivity 1,058 (*)     Narrative:     Less than 99th percentile of normal range cutoff-  Female and children under 18 years old <14 ng/L; Male <21 ng/L: Negative  Repeat testing should be performed if clinically indicated.     Female and children under 18 years old 14-50 ng/L; Male 21-50 ng/L:  Consistent with possible cardiac damage and possible increased clinical   risk. Serial measurements may help to assess extent of myocardial damage.     >50 ng/L: Consistent with cardiac damage, increased clinical risk and  myocardial infarction. Serial measurements may help assess extent of   myocardial damage.      NOTE: Children less than 1 year old may have higher baseline troponin   levels and results should be interpreted in conjunction with the overall   clinical context.     NOTE: Troponin I testing is performed using a different   testing methodology at [facility] than at other   [facility]. Direct result comparisons should only   be made within the same method.   SERIAL TROPONIN, 1 HOUR - Abnormal    Troponin I, High Sensitivity 814 (*)     Narrative:     Less than 99th percentile of normal range cutoff-  Female and children under 18 years old <14 ng/L; Male <21 ng/L: Negative  Repeat testing should be performed if clinically indicated.     Female and children under 18 years old 14-50 ng/L; Male 21-50 ng/L:  Consistent with possible cardiac damage and possible increased  clinical   risk. Serial measurements may help to assess extent of myocardial damage.     >50 ng/L: Consistent with cardiac damage, increased clinical risk and  myocardial infarction. Serial measurements may help assess extent of   myocardial damage.      NOTE: Children less than 1 year old may have higher baseline troponin   levels and results should be interpreted in conjunction with the overall   clinical context.     NOTE: Troponin I testing is performed using a different   testing methodology at [facility] than at other   [facility]. Direct result comparisons should only   be made within the same method.   B-TYPE NATRIURETIC PEPTIDE - Abnormal    BNP 2,894 (*)     Narrative:        <100 pg/mL - Heart failure unlikely  100-299 pg/mL - Intermediate probability of acute heart                  failure exacerbation. Correlate with clinical                  context and patient history.    >=300 pg/mL - Heart Failure likely. Correlate with clinical                  context and patient history.    BNP testing is performed using different testing methodology at [facility] than at other [facility]. Direct result comparisons should only be made within the same method.      TROPONIN I, HIGH SENSITIVITY - Abnormal    Troponin I, High Sensitivity 1,039 (*)     Narrative:     Less than 99th percentile of normal range cutoff-  Female and children under 18 years old <14 ng/L; Male <21 ng/L: Negative  Repeat testing should be performed if clinically indicated.     Female and children under 18 years old 14-50 ng/L; Male 21-50 ng/L:  Consistent with possible cardiac damage and possible increased clinical   risk. Serial measurements may help to assess extent of myocardial damage.     >50 ng/L: Consistent with cardiac damage, increased clinical risk and  myocardial infarction. Serial measurements may help assess extent of   myocardial damage.      NOTE: Children less than 1 year old may have  higher baseline troponin   levels and results should be interpreted in conjunction with the overall   clinical context.     NOTE: Troponin I testing is performed using a different   testing methodology at [facility] than at other   [facility]. Direct result comparisons should only   be made within the same method.   MAGNESIUM - Normal    Magnesium 2.19     SARS-COV-2 AND INFLUENZA A/B PCR - Normal    Flu A Result Not Detected      Flu B Result Not Detected      Coronavirus 2019, PCR Not Detected      Narrative:     This assay has received FDA Emergency Use Authorization (EUA) and  is only authorized for the duration of time that circumstances exist to justify the authorization of the emergency use of in vitro diagnostic tests for the detection of SARS-CoV-2 virus and/or diagnosis of COVID-19 infection under section 564(b)(1) of the Act, 21 U.S.C. 360bbb-3(b)(1). Testing for SARS-CoV-2 is only recommended for patients who meet current clinical and/or epidemiological criteria as defined by federal, state, or local public health directives. This assay is an in vitro diagnostic nucleic acid amplification test for the qualitative detection of SARS-CoV-2, Influenza A, and Influenza B from nasopharyngeal specimens and has been validated for use at [facility]. Negative results do not preclude COVID-19 infections or Influenza A/B infections, and should not be used as the sole basis for diagnosis, treatment, or other management decisions. If Influenza A/B and RSV PCR results are negative, testing for Parainfluenza virus, Adenovirus and Metapneumovirus is routinely performed for [facility] [place] and intensive care inpatients, and is available on other patients by placing an add-on request.    LACTATE - Normal    Lactate 1.6      Narrative:     Venipuncture immediately after or during the administration of Metamizole may lead to falsely low results. Testing should be performed  immediately  prior to Metamizole dosing.   BLOOD CULTURE   BLOOD CULTURE   TROPONIN SERIES- (INITIAL, 1 HR)    Narrative:     The following orders were created for panel order Troponin I Series, High Sensitivity (0, 1 HR).  Procedure                               Abnormality         Status                     ---------                               -----------         ------                     Troponin I, High Sensiti...[[id number]]  Abnormal            Final result               Troponin, High Sensitivi...[[id number]]  Abnormal            Final result                 Please view results for these tests on the individual orders.   HEPARIN ASSAY        CT angio chest for pulmonary embolism   Final Result   1. No evidence of pulmonary embolism.   2. COPD with pulmonary hypertension.   3. Subsegmental densities in the lingula, due to atelectasis,   aspiration, or pneumonia.   4. Stable benign nodule in the right upper lobe, measuring 0.8 cm.   5. Trace left-sided layering pleural effusion.             MACRO:   None        Signed by: Cooper Bansal 12/27/2023 3:50 PM   Dictation workstation:   [id number]      XR chest 1 view   Final Result   Stable findings of prior granulomatous disease.        Stable scarring at the lung bases.        Mild cardiomegaly.        Previous surgery involving the left breast and left axilla.        No significant interval change.        Signed by: Eliel Gracia 12/27/2023 1:59 PM   Dictation workstation:   [id number]           Critical Care    Performed by: Kavita Kntot PA-C  Authorized by: Mars Lundberg DO    Critical care provider statement:     Critical care time (minutes):  60    Critical care time was exclusive of:  Separately billable procedures and treating other patients    Critical care was necessary to treat or prevent imminent or life-threatening deterioration of the following conditions:  Respiratory failure    Care discussed with: accepting provider at another  facility         Medical Decision Making  Patient presented to the emergency department with shortness of breath.  Upon arrival she was hypoxic at 55% on 4 L nasal cannula.  Patient was placed on a Ventimask satting 35% with a saturation of 97%.  Upon arrival she was tachycardic at 134.  EKG shows sinus tachycardia with right bundle branch block and PACs.  Patient's initial troponin was noted to be elevated at 1058.  Delta was 814.  Third troponin was 1039.  CTA of the chest shows no PE.  There is COPD with pulmonary hypertension noted on CTA and potential PNA in which patient was treated with Azithromycin and Rocephin. I consulted with Dr. Ramicone, cardiologist, who felt that at this time patient could be admitted at [facility] as troponin elevation is likely from demand ischemia. He recommended that heparin be started and reevaluation would be made after patient had an echo. Patient was seen and evaluated by myself along with attending physician, Dr. Hodgson    I spoke to hospitalist, Dr. Russell who does not feel comfortable with accepting the patient here at as we do not have cardiology coverage. I spoke with patient who would like to be transferred to [facility].    Still awaiting bed placement at [facility]. No beds available at this time. Patient care will be transitioned to attending physician, Dr. Hodgson at 2200.     Amount and/or Complexity of Data Reviewed  Labs: ordered. Decision-making details documented in ED Course.  Radiology: ordered and independent interpretation performed.     Details: No obvious acute findings  ECG/medicine tests: ordered and independent interpretation performed. Decision-making details documented in ED Course.     Details: EKG shows sinus tachycardia with a rate of 134 bpm.  No ST elevation.  Right bundle branch block.  Premature atrial contractions.    Repeat EKG shows sinus tachycardia with a rate of 127 bpm.  No ST elevation.  PVCs and PACs.  Bifascicular  block.    Risk  Decision regarding hospitalization.         Diagnoses as of 12/27/23 2213   COPD exacerbation (CMS/[facility])   Pulmonary nodule, right   Hypoxia   Pneumonia due to infectious organism, unspecified laterality, unspecified part of lung   NSTEMI (non-ST elevated myocardial infarction) (CMS/[facility])   Elevated brain natriuretic peptide (BNP) level                    Kavita Knott PA-C  12/27/23 2214

## 2023-12-27 NOTE — Clinical Note
Is the patient on isolation?: No  Do you expect the patient to require telemetry (informational-only for bed management): No  Do you expect the patient to require observation or inpt? (informational-only for bed management): Inpatient

## 2023-12-28 ENCOUNTER — APPOINTMENT (OUTPATIENT)
Dept: CARDIOLOGY | Facility: HOSPITAL | Age: 83
DRG: 280 | End: 2023-12-28
Payer: MEDICARE

## 2023-12-28 VITALS
TEMPERATURE: 97.5 F | WEIGHT: 116.84 LBS | RESPIRATION RATE: 18 BRPM | DIASTOLIC BLOOD PRESSURE: 88 MMHG | HEIGHT: 62 IN | BODY MASS INDEX: 21.5 KG/M2 | OXYGEN SATURATION: 91 % | SYSTOLIC BLOOD PRESSURE: 135 MMHG | HEART RATE: 84 BPM

## 2023-12-28 PROBLEM — I21.4 NSTEMI (NON-ST ELEVATED MYOCARDIAL INFARCTION) (MULTI): Status: ACTIVE | Noted: 2023-12-28

## 2023-12-28 LAB
ALBUMIN SERPL BCP-MCNC: 3 G/DL (ref 3.4–5)
ALP SERPL-CCNC: 68 U/L (ref 33–136)
ALT SERPL W P-5'-P-CCNC: 16 U/L (ref 7–45)
ANION GAP SERPL CALC-SCNC: 10 MMOL/L (ref 10–20)
ANION GAP SERPL CALC-SCNC: 9 MMOL/L (ref 10–20)
AORTIC VALVE MEAN GRADIENT: 3
AORTIC VALVE PEAK VELOCITY: 1.23
AST SERPL W P-5'-P-CCNC: 21 U/L (ref 9–39)
AV PEAK GRADIENT: 6.1
AVA (PEAK VEL): 1.05
AVA (VTI): 0.84
BILIRUB SERPL-MCNC: 0.7 MG/DL (ref 0–1.2)
BUN SERPL-MCNC: 21 MG/DL (ref 6–23)
BUN SERPL-MCNC: 22 MG/DL (ref 6–23)
CALCIUM SERPL-MCNC: 8.7 MG/DL (ref 8.6–10.3)
CALCIUM SERPL-MCNC: 8.9 MG/DL (ref 8.6–10.3)
CARDIAC TROPONIN I PNL SERPL HS: 678 NG/L (ref 0–13)
CARDIAC TROPONIN I PNL SERPL HS: 703 NG/L (ref 0–13)
CHLORIDE SERPL-SCNC: 96 MMOL/L (ref 98–107)
CHLORIDE SERPL-SCNC: 96 MMOL/L (ref 98–107)
CO2 SERPL-SCNC: 31 MMOL/L (ref 21–32)
CO2 SERPL-SCNC: 32 MMOL/L (ref 21–32)
CREAT SERPL-MCNC: 0.49 MG/DL (ref 0.5–1.05)
CREAT SERPL-MCNC: 0.54 MG/DL (ref 0.5–1.05)
EJECTION FRACTION APICAL 4 CHAMBER: 37.9
EJECTION FRACTION: 40
ERYTHROCYTE [DISTWIDTH] IN BLOOD BY AUTOMATED COUNT: 13.5 % (ref 11.5–14.5)
ERYTHROCYTE [DISTWIDTH] IN BLOOD BY AUTOMATED COUNT: 13.7 % (ref 11.5–14.5)
GFR SERPL CREATININE-BSD FRML MDRD: >90 ML/MIN/1.73M*2
GFR SERPL CREATININE-BSD FRML MDRD: >90 ML/MIN/1.73M*2
GLUCOSE SERPL-MCNC: 135 MG/DL (ref 74–99)
GLUCOSE SERPL-MCNC: 143 MG/DL (ref 74–99)
HCT VFR BLD AUTO: 47.2 % (ref 36–46)
HCT VFR BLD AUTO: 48.5 % (ref 36–46)
HGB BLD-MCNC: 15.4 G/DL (ref 12–16)
HGB BLD-MCNC: 15.7 G/DL (ref 12–16)
LEFT ATRIUM VOLUME AREA LENGTH INDEX BSA: 21.8
LEFT VENTRICLE INTERNAL DIMENSION DIASTOLE: 3.5 (ref 3.5–6)
LEFT VENTRICULAR OUTFLOW TRACT DIAMETER: 1.4
MCH RBC QN AUTO: 30.2 PG (ref 26–34)
MCH RBC QN AUTO: 30.4 PG (ref 26–34)
MCHC RBC AUTO-ENTMCNC: 32.4 G/DL (ref 32–36)
MCHC RBC AUTO-ENTMCNC: 32.6 G/DL (ref 32–36)
MCV RBC AUTO: 93 FL (ref 80–100)
MCV RBC AUTO: 93 FL (ref 80–100)
MITRAL VALVE E/A RATIO: 0.71
NRBC BLD-RTO: 0 /100 WBCS (ref 0–0)
NRBC BLD-RTO: 0 /100 WBCS (ref 0–0)
PLATELET # BLD AUTO: 192 X10*3/UL (ref 150–450)
PLATELET # BLD AUTO: 197 X10*3/UL (ref 150–450)
POTASSIUM SERPL-SCNC: 4.2 MMOL/L (ref 3.5–5.3)
POTASSIUM SERPL-SCNC: 4.3 MMOL/L (ref 3.5–5.3)
PROCALCITONIN SERPL-MCNC: 0.1 NG/ML
PROT SERPL-MCNC: 5.1 G/DL (ref 6.4–8.2)
RBC # BLD AUTO: 5.07 X10*6/UL (ref 4–5.2)
RBC # BLD AUTO: 5.2 X10*6/UL (ref 4–5.2)
RIGHT VENTRICLE PEAK SYSTOLIC PRESSURE: 38.8
SODIUM SERPL-SCNC: 133 MMOL/L (ref 136–145)
SODIUM SERPL-SCNC: 133 MMOL/L (ref 136–145)
TRICUSPID ANNULAR PLANE SYSTOLIC EXCURSION: 1.6
UFH PPP CHRO-ACNC: 0.3 IU/ML
UFH PPP CHRO-ACNC: 0.6 IU/ML
WBC # BLD AUTO: 5.4 X10*3/UL (ref 4.4–11.3)
WBC # BLD AUTO: 7.3 X10*3/UL (ref 4.4–11.3)

## 2023-12-28 PROCEDURE — 93306 TTE W/DOPPLER COMPLETE: CPT

## 2023-12-28 PROCEDURE — 80048 BASIC METABOLIC PNL TOTAL CA: CPT | Mod: CCI | Performed by: HOSPITALIST

## 2023-12-28 PROCEDURE — 1200000002 HC GENERAL ROOM WITH TELEMETRY DAILY

## 2023-12-28 PROCEDURE — 36415 COLL VENOUS BLD VENIPUNCTURE: CPT | Performed by: HOSPITALIST

## 2023-12-28 PROCEDURE — 80053 COMPREHEN METABOLIC PANEL: CPT | Performed by: HOSPITALIST

## 2023-12-28 PROCEDURE — 94762 N-INVAS EAR/PLS OXIMTRY CONT: CPT

## 2023-12-28 PROCEDURE — 94640 AIRWAY INHALATION TREATMENT: CPT

## 2023-12-28 PROCEDURE — 99223 1ST HOSP IP/OBS HIGH 75: CPT | Performed by: HOSPITALIST

## 2023-12-28 PROCEDURE — 2500000001 HC RX 250 WO HCPCS SELF ADMINISTERED DRUGS (ALT 637 FOR MEDICARE OP): Performed by: HOSPITALIST

## 2023-12-28 PROCEDURE — 93005 ELECTROCARDIOGRAM TRACING: CPT

## 2023-12-28 PROCEDURE — 85520 HEPARIN ASSAY: CPT | Performed by: HOSPITALIST

## 2023-12-28 PROCEDURE — 93306 TTE W/DOPPLER COMPLETE: CPT | Performed by: INTERNAL MEDICINE

## 2023-12-28 PROCEDURE — 87070 CULTURE OTHR SPECIMN AEROBIC: CPT | Mod: SAMLAB

## 2023-12-28 PROCEDURE — 84484 ASSAY OF TROPONIN QUANT: CPT

## 2023-12-28 PROCEDURE — 2500000004 HC RX 250 GENERAL PHARMACY W/ HCPCS (ALT 636 FOR OP/ED): Performed by: HOSPITALIST

## 2023-12-28 PROCEDURE — 2500000002 HC RX 250 W HCPCS SELF ADMINISTERED DRUGS (ALT 637 FOR MEDICARE OP, ALT 636 FOR OP/ED): Performed by: HOSPITALIST

## 2023-12-28 PROCEDURE — 85027 COMPLETE CBC AUTOMATED: CPT | Performed by: HOSPITALIST

## 2023-12-28 PROCEDURE — 99231 SBSQ HOSP IP/OBS SF/LOW 25: CPT

## 2023-12-28 PROCEDURE — 84484 ASSAY OF TROPONIN QUANT: CPT | Performed by: HOSPITALIST

## 2023-12-28 PROCEDURE — 84145 PROCALCITONIN (PCT): CPT | Mod: SAMLAB | Performed by: HOSPITALIST

## 2023-12-28 RX ORDER — PANTOPRAZOLE SODIUM 40 MG/1
40 TABLET, DELAYED RELEASE ORAL
Status: DISCONTINUED | OUTPATIENT
Start: 2023-12-28 | End: 2023-12-28 | Stop reason: HOSPADM

## 2023-12-28 RX ORDER — PREDNISONE 20 MG/1
40 TABLET ORAL DAILY
Start: 2023-12-29 | End: 2024-03-01 | Stop reason: ALTCHOICE

## 2023-12-28 RX ORDER — AZITHROMYCIN 500 MG/1
500 TABLET, FILM COATED ORAL
Start: 2023-12-29 | End: 2024-03-01 | Stop reason: ALTCHOICE

## 2023-12-28 RX ORDER — AZITHROMYCIN 250 MG/1
500 TABLET, FILM COATED ORAL
Status: DISCONTINUED | OUTPATIENT
Start: 2023-12-28 | End: 2023-12-28 | Stop reason: HOSPADM

## 2023-12-28 RX ORDER — IPRATROPIUM BROMIDE AND ALBUTEROL SULFATE 2.5; .5 MG/3ML; MG/3ML
3 SOLUTION RESPIRATORY (INHALATION)
Status: DISCONTINUED | OUTPATIENT
Start: 2023-12-28 | End: 2023-12-28 | Stop reason: HOSPADM

## 2023-12-28 RX ORDER — HEPARIN SODIUM 10000 [USP'U]/100ML
0-4000 INJECTION, SOLUTION INTRAVENOUS CONTINUOUS
Start: 2023-12-28 | End: 2024-03-01 | Stop reason: ALTCHOICE

## 2023-12-28 RX ORDER — ACETAMINOPHEN 650 MG/1
650 SUPPOSITORY RECTAL EVERY 4 HOURS PRN
Status: DISCONTINUED | OUTPATIENT
Start: 2023-12-28 | End: 2023-12-28 | Stop reason: HOSPADM

## 2023-12-28 RX ORDER — ACETAMINOPHEN 325 MG/1
650 TABLET ORAL EVERY 4 HOURS PRN
Status: DISCONTINUED | OUTPATIENT
Start: 2023-12-28 | End: 2023-12-28 | Stop reason: HOSPADM

## 2023-12-28 RX ORDER — ATORVASTATIN CALCIUM 40 MG/1
40 TABLET, FILM COATED ORAL NIGHTLY
Start: 2023-12-28

## 2023-12-28 RX ORDER — ONDANSETRON HYDROCHLORIDE 2 MG/ML
4 INJECTION, SOLUTION INTRAVENOUS EVERY 8 HOURS PRN
Status: DISCONTINUED | OUTPATIENT
Start: 2023-12-28 | End: 2023-12-28 | Stop reason: HOSPADM

## 2023-12-28 RX ORDER — ACETAMINOPHEN 160 MG/5ML
650 SOLUTION ORAL EVERY 4 HOURS PRN
Status: DISCONTINUED | OUTPATIENT
Start: 2023-12-28 | End: 2023-12-28 | Stop reason: HOSPADM

## 2023-12-28 RX ORDER — BUDESONIDE 0.25 MG/2ML
0.25 INHALANT ORAL
Start: 2023-12-28 | End: 2024-03-01 | Stop reason: ALTCHOICE

## 2023-12-28 RX ORDER — PREDNISONE 20 MG/1
40 TABLET ORAL DAILY
Status: DISCONTINUED | OUTPATIENT
Start: 2023-12-28 | End: 2023-12-28 | Stop reason: HOSPADM

## 2023-12-28 RX ORDER — ATORVASTATIN CALCIUM 40 MG/1
40 TABLET, FILM COATED ORAL NIGHTLY
Status: DISCONTINUED | OUTPATIENT
Start: 2023-12-28 | End: 2023-12-28 | Stop reason: HOSPADM

## 2023-12-28 RX ORDER — BUDESONIDE 0.25 MG/2ML
0.25 INHALANT ORAL
Status: DISCONTINUED | OUTPATIENT
Start: 2023-12-28 | End: 2023-12-28 | Stop reason: HOSPADM

## 2023-12-28 RX ORDER — METOPROLOL TARTRATE 50 MG/1
50 TABLET ORAL EVERY 12 HOURS
Status: DISCONTINUED | OUTPATIENT
Start: 2023-12-28 | End: 2023-12-28 | Stop reason: HOSPADM

## 2023-12-28 RX ORDER — CEFTRIAXONE 2 G/50ML
2 INJECTION, SOLUTION INTRAVENOUS EVERY 24 HOURS
Start: 2023-12-28 | End: 2024-03-01 | Stop reason: ALTCHOICE

## 2023-12-28 RX ORDER — POLYETHYLENE GLYCOL 3350 17 G/17G
17 POWDER, FOR SOLUTION ORAL DAILY
Status: DISCONTINUED | OUTPATIENT
Start: 2023-12-28 | End: 2023-12-28 | Stop reason: HOSPADM

## 2023-12-28 RX ORDER — ASPIRIN 81 MG/1
81 TABLET ORAL DAILY
Status: DISCONTINUED | OUTPATIENT
Start: 2023-12-28 | End: 2023-12-28 | Stop reason: HOSPADM

## 2023-12-28 RX ORDER — FORMOTEROL FUMARATE 20 UG/2ML
20 SOLUTION RESPIRATORY (INHALATION)
Start: 2023-12-28 | End: 2024-03-01 | Stop reason: ALTCHOICE

## 2023-12-28 RX ORDER — CEFTRIAXONE 2 G/50ML
2 INJECTION, SOLUTION INTRAVENOUS EVERY 24 HOURS
Status: DISCONTINUED | OUTPATIENT
Start: 2023-12-28 | End: 2023-12-28 | Stop reason: HOSPADM

## 2023-12-28 RX ORDER — BUDESONIDE AND FORMOTEROL FUMARATE DIHYDRATE 80; 4.5 UG/1; UG/1
2 AEROSOL RESPIRATORY (INHALATION)
Status: DISCONTINUED | OUTPATIENT
Start: 2023-12-28 | End: 2023-12-28 | Stop reason: CLARIF

## 2023-12-28 RX ORDER — ASPIRIN 81 MG/1
81 TABLET ORAL DAILY
Start: 2023-12-29

## 2023-12-28 RX ORDER — ONDANSETRON 4 MG/1
4 TABLET, ORALLY DISINTEGRATING ORAL EVERY 8 HOURS PRN
Status: DISCONTINUED | OUTPATIENT
Start: 2023-12-28 | End: 2023-12-28 | Stop reason: HOSPADM

## 2023-12-28 RX ORDER — POTASSIUM CHLORIDE 20 MEQ/1
20 TABLET, EXTENDED RELEASE ORAL DAILY
Status: DISCONTINUED | OUTPATIENT
Start: 2023-12-28 | End: 2023-12-28 | Stop reason: HOSPADM

## 2023-12-28 RX ORDER — FORMOTEROL FUMARATE 20 UG/2ML
20 SOLUTION RESPIRATORY (INHALATION)
Status: DISCONTINUED | OUTPATIENT
Start: 2023-12-28 | End: 2023-12-28 | Stop reason: HOSPADM

## 2023-12-28 RX ORDER — PANTOPRAZOLE SODIUM 40 MG/10ML
40 INJECTION, POWDER, LYOPHILIZED, FOR SOLUTION INTRAVENOUS
Status: DISCONTINUED | OUTPATIENT
Start: 2023-12-28 | End: 2023-12-28 | Stop reason: HOSPADM

## 2023-12-28 RX ADMIN — ASPIRIN 81 MG: 81 TABLET, COATED ORAL at 08:40

## 2023-12-28 RX ADMIN — ATORVASTATIN CALCIUM 40 MG: 40 TABLET, FILM COATED ORAL at 02:34

## 2023-12-28 RX ADMIN — PERFLUTREN 1 ML OF DILUTION: 6.52 INJECTION, SUSPENSION INTRAVENOUS at 05:30

## 2023-12-28 RX ADMIN — FORMOTEROL FUMARATE DIHYDRATE 20 MCG: 20 SOLUTION RESPIRATORY (INHALATION) at 09:48

## 2023-12-28 RX ADMIN — AZITHROMYCIN DIHYDRATE 500 MG: 250 TABLET ORAL at 08:40

## 2023-12-28 RX ADMIN — IPRATROPIUM BROMIDE AND ALBUTEROL SULFATE 3 ML: 2.5; .5 SOLUTION RESPIRATORY (INHALATION) at 02:51

## 2023-12-28 RX ADMIN — PREDNISONE 40 MG: 20 TABLET ORAL at 08:39

## 2023-12-28 RX ADMIN — METOPROLOL TARTRATE 50 MG: 50 TABLET, FILM COATED ORAL at 13:51

## 2023-12-28 RX ADMIN — IPRATROPIUM BROMIDE AND ALBUTEROL SULFATE 3 ML: 2.5; .5 SOLUTION RESPIRATORY (INHALATION) at 14:02

## 2023-12-28 RX ADMIN — POTASSIUM CHLORIDE 20 MEQ: 1500 TABLET, EXTENDED RELEASE ORAL at 08:40

## 2023-12-28 RX ADMIN — PANTOPRAZOLE SODIUM 40 MG: 40 TABLET, DELAYED RELEASE ORAL at 06:18

## 2023-12-28 RX ADMIN — BUDESONIDE 0.25 MG: 0.25 INHALANT RESPIRATORY (INHALATION) at 09:49

## 2023-12-28 RX ADMIN — METOPROLOL TARTRATE 50 MG: 50 TABLET, FILM COATED ORAL at 02:34

## 2023-12-28 RX ADMIN — IPRATROPIUM BROMIDE AND ALBUTEROL SULFATE 3 ML: 2.5; .5 SOLUTION RESPIRATORY (INHALATION) at 06:28

## 2023-12-28 SDOH — SOCIAL STABILITY: SOCIAL INSECURITY: HAVE YOU HAD THOUGHTS OF HARMING ANYONE ELSE?: NO

## 2023-12-28 SDOH — SOCIAL STABILITY: SOCIAL INSECURITY: DO YOU FEEL ANYONE HAS EXPLOITED OR TAKEN ADVANTAGE OF YOU FINANCIALLY OR OF YOUR PERSONAL PROPERTY?: NO

## 2023-12-28 SDOH — SOCIAL STABILITY: SOCIAL INSECURITY: DO YOU FEEL UNSAFE GOING BACK TO THE PLACE WHERE YOU ARE LIVING?: NO

## 2023-12-28 SDOH — SOCIAL STABILITY: SOCIAL INSECURITY: DOES ANYONE TRY TO KEEP YOU FROM HAVING/CONTACTING OTHER FRIENDS OR DOING THINGS OUTSIDE YOUR HOME?: NO

## 2023-12-28 SDOH — SOCIAL STABILITY: SOCIAL INSECURITY: ARE YOU OR HAVE YOU BEEN THREATENED OR ABUSED PHYSICALLY, EMOTIONALLY, OR SEXUALLY BY ANYONE?: NO

## 2023-12-28 SDOH — SOCIAL STABILITY: SOCIAL INSECURITY: ABUSE: ADULT

## 2023-12-28 SDOH — SOCIAL STABILITY: SOCIAL INSECURITY: HAS ANYONE EVER THREATENED TO HURT YOUR FAMILY OR YOUR PETS?: NO

## 2023-12-28 SDOH — SOCIAL STABILITY: SOCIAL INSECURITY: WERE YOU ABLE TO COMPLETE ALL THE BEHAVIORAL HEALTH SCREENINGS?: YES

## 2023-12-28 SDOH — SOCIAL STABILITY: SOCIAL INSECURITY: ARE THERE ANY APPARENT SIGNS OF INJURIES/BEHAVIORS THAT COULD BE RELATED TO ABUSE/NEGLECT?: NO

## 2023-12-28 ASSESSMENT — COGNITIVE AND FUNCTIONAL STATUS - GENERAL
PATIENT BASELINE BEDBOUND: NO
MOVING TO AND FROM BED TO CHAIR: A LITTLE
STANDING UP FROM CHAIR USING ARMS: A LITTLE
HELP NEEDED FOR BATHING: A LITTLE
CLIMB 3 TO 5 STEPS WITH RAILING: A LITTLE
MOBILITY SCORE: 20
STANDING UP FROM CHAIR USING ARMS: A LITTLE
DAILY ACTIVITIY SCORE: 23
MOVING TO AND FROM BED TO CHAIR: A LITTLE
HELP NEEDED FOR BATHING: A LITTLE
WALKING IN HOSPITAL ROOM: A LITTLE
DAILY ACTIVITIY SCORE: 23
CLIMB 3 TO 5 STEPS WITH RAILING: A LITTLE
WALKING IN HOSPITAL ROOM: A LITTLE
MOBILITY SCORE: 20

## 2023-12-28 ASSESSMENT — ACTIVITIES OF DAILY LIVING (ADL)
FEEDING YOURSELF: INDEPENDENT
HEARING - LEFT EAR: FUNCTIONAL
PATIENT'S MEMORY ADEQUATE TO SAFELY COMPLETE DAILY ACTIVITIES?: YES
BATHING: INDEPENDENT
TOILETING: INDEPENDENT
LACK_OF_TRANSPORTATION: NO
WALKS IN HOME: INDEPENDENT
HEARING - RIGHT EAR: FUNCTIONAL
ADEQUATE_TO_COMPLETE_ADL: YES
LACK_OF_TRANSPORTATION: NO
DRESSING YOURSELF: INDEPENDENT
GROOMING: INDEPENDENT
JUDGMENT_ADEQUATE_SAFELY_COMPLETE_DAILY_ACTIVITIES: YES

## 2023-12-28 ASSESSMENT — LIFESTYLE VARIABLES
HOW MANY STANDARD DRINKS CONTAINING ALCOHOL DO YOU HAVE ON A TYPICAL DAY: PATIENT DOES NOT DRINK
AUDIT-C TOTAL SCORE: 0
HOW OFTEN DO YOU HAVE A DRINK CONTAINING ALCOHOL: NEVER
HOW OFTEN DO YOU HAVE 6 OR MORE DRINKS ON ONE OCCASION: NEVER
AUDIT-C TOTAL SCORE: 0
SKIP TO QUESTIONS 9-10: 1

## 2023-12-28 ASSESSMENT — PATIENT HEALTH QUESTIONNAIRE - PHQ9
SUM OF ALL RESPONSES TO PHQ9 QUESTIONS 1 & 2: 0
1. LITTLE INTEREST OR PLEASURE IN DOING THINGS: NOT AT ALL
2. FEELING DOWN, DEPRESSED OR HOPELESS: NOT AT ALL

## 2023-12-28 ASSESSMENT — PAIN - FUNCTIONAL ASSESSMENT
PAIN_FUNCTIONAL_ASSESSMENT: 0-10
PAIN_FUNCTIONAL_ASSESSMENT: 0-10

## 2023-12-28 ASSESSMENT — PAIN SCALES - GENERAL
PAINLEVEL_OUTOF10: 0 - NO PAIN

## 2023-12-28 NOTE — NURSING NOTE
This nurse spoke with Ramona from transfer center and received patient's bed assignment to [facility], room#3715. Pt remains on Heparin gtt and 4lpm oxygen via nasal cannula. Pt denies any chest pain or discomfort. Awaiting transport.

## 2023-12-28 NOTE — DISCHARGE SUMMARY
Discharge Diagnosis  COPD exacerbation (CMS/[facility])    Issues Requiring Follow-Up  NSTEMI  pneumonia    Discharge Meds     Your medication list        ASK your doctor about these medications        Instructions Last Dose Given Next Dose Due   albuterol 90 mcg/actuation inhaler      Inhale 2 puffs 4 times a day.       budesonide-formoteroL 80-4.5 mcg/actuation inhaler  Commonly known as: Symbicort      Inhale 2 puffs 2 times a day.       Combivent Respimat  mcg/actuation inhaler  Generic drug: ipratropium-albuteroL      INHALE 1 PUFF 4 TIMES DAILY (MAXIMUM OF 6 PUFFS IN 24 HOURS)       ipratropium-albuteroL 0.5-2.5 mg/3 mL nebulizer solution  Commonly known as: Duo-Neb      INHALE THE CONTENTS OF 1 VIAL VIA NEBULIZER FOUR TIMES DAILY       hydroCHLOROthiazide 25 mg tablet  Commonly known as: HYDRODiuril      TAKE 1 TABLET EVERY DAY AS NEEDED FOR SWELLING       ibandronate 150 mg tablet  Commonly known as: Boniva      TAKE 1 TABLET ONCE MONTHLY.       metoprolol tartrate 50 mg tablet  Commonly known as: Lopressor      TAKE 1 TABLET EVERY 12 HOURS       potassium chloride CR 20 mEq ER tablet  Commonly known as: Klor-Con M20           predniSONE 20 mg tablet  Commonly known as: Deltasone      Take 3 tabs (60mg) daily for 3 days, then take 2 tabs (40mg) daily for 3 days, then take 1 tab (20mg) daily for 3 days.       predniSONE 20 mg tablet  Commonly known as: Deltasone      Take 3 tabs (60mg) daily for 3 days, then take 2 tabs (40mg) daily for 3 days, then take 1 tab (20mg) daily for 3 days.       SYSTANE COMPLETE OPHT                    Test Results Pending At Discharge  Pending Labs       Order Current Status    Procalcitonin In process    Blood Culture Preliminary result    Blood Culture Preliminary result            Hospital Course   Melissa Goodwin is a 83 y.o. female presenting with shortness of breath for a week worsening in the last 2 to 3 days along with productive cough no hemoptysis or wheeze and no chest  pain or fever chills without any nausea vomiting or bleeding CTA showing possible pneumonia but pulmonary embolism as underlying COPD wears 3 to 4 L oxygen via nasal cannula at home placed on Venturi mask with acute hypoxemic on chronic hypoxemic/hypercapnic respiratory failure troponin elevated with NSTEMI Case was discussed with cardiology recommend to start heparin.  Patient in the process of transfer to [facility] when bed is available.  No back pain flank pain hematuria or dysuria.  No hypertension weight loss.  No runny nose sore throat or sinus nasal congestion.  No ear pain pressure fullness discharge.  No headache focal weakness.  No worsening leg swelling or palpitations or dizziness.  No blurry vision or otorrhea rhinorrhea.  Patient denies any other complaints feels slightly better ever since she came to the hospital  EKG sinus tach with right bundle branch block and PACs, PVCs    CT of the chest was done that showed no pulmonary embolism, COPD with pulmonary hypertension, stable benign nodule in the right upper lobe measuring 0.8 cm, subsegmental densities in the lingula due to atelectasis, aspiration, or pneumonia.  Patient was started on Rocephin and azithromycin.    Was informed that [facility] did not have beds available and family was agreeable to reach out to [facility].  Was informed by [facility] that a bed is available in room 3715.  Patient will be transferred with heparin drip.  Troponin is trending down.  Last check at 1129 was 678.  Troponin was 1058 on admission with a BNP of 2894.  Patient on O2 at 4 L but states she only uses as needed at home    Pertinent Physical Exam At Time of Discharge  Physical Exam  General Appearance: AAO x 3, not in acute distress  Skin: skin color pink, warm, and dry; no suspicious rashes or lesions  Eyes : PERRL, EOM's intact  ENT: mucous membranes pink and moist  Neck: normocephalic  Respiratory: lungs clear to auscultation anteriorly;  no wheezing, rhonchi, or crackles. O2 at 4L  Heart: regular rate and rhythm. telemetry shows sinus rhythm  Abdomen: Nondistended, positive bowel sounds x4, soft,  nontender  Extremities: no edema   Peripheral pulses: normal x4 extremities  Neuro: alert, coherent and conversant, no focal motor deficits  Outpatient Follow-Up  Future Appointments   Date Time Provider Department Center   2/15/2024 11:00 AM Mindy Mayberry DO [id number]         ROSIE Kebede-CNP

## 2023-12-28 NOTE — NURSING NOTE
[facility] ambulance arrived and report given by this nurse. Patient denies and pain or discomfort. Daughter remains at bedside. Physician's team at bedside.

## 2023-12-28 NOTE — NURSING NOTE
Report called to ILAN Myers at [facility], patient remains assigned to room# 8965. Unit number provided for any further questions.

## 2023-12-28 NOTE — H&P
History Of Present Illness  Melissa Goodwin is a 83 y.o. female presenting with shortness of breath for a week worsening in the last 2 to 3 days along with productive cough no hemoptysis or wheeze and no chest pain or fever chills without any nausea vomiting or bleeding CTA showing possible pneumonia but pulmonary embolism as underlying COPD wears 3 to 4 L oxygen via nasal cannula at home placed on Venturi mask with acute hypoxemic on chronic hypoxemic/hypercapnic respiratory failure troponin elevated with NSTEMI Case was discussed with cardiology recommend to start heparin.  Patient in the process of transfer to [facility] when bed is available.  No back pain flank pain hematuria or dysuria.  No hypertension weight loss.  No runny nose sore throat or sinus nasal congestion.  No ear pain pressure fullness discharge.  No headache focal weakness.  No worsening leg swelling or palpitations or dizziness.  No blurry vision or otorrhea rhinorrhea.  Patient denies any other complaints feels slightly better ever since she came to the hospital  EKG sinus tach with right bundle branch block and PACs, PVCs  Past Medical History  Past Medical History:   Diagnosis Date    COPD (chronic obstructive pulmonary disease) (CMS/HCC)     Diverticulitis of intestine, part unspecified, without perforation or abscess without bleeding 12/24/2019    Acute diverticulitis    Essential (primary) hypertension 07/20/2022    HTN (hypertension)    Personal history of malignant neoplasm of breast     History of malignant neoplasm of breast    Personal history of other malignant neoplasm of large intestine 03/16/2022    History of malignant neoplasm of colon    Personal history of other malignant neoplasm of large intestine 03/16/2022    History of malignant neoplasm of colon   GERD, anxiety,  pulmonary nodule, COPD  Hypertension    Surgical History  Past Surgical History:   Procedure Laterality Date    OTHER SURGICAL HISTORY  11/08/2019     "Lumpectomy    OTHER SURGICAL HISTORY  11/08/2019    Breast biopsy    OTHER SURGICAL HISTORY  11/08/2019    Cholecystectomy    OTHER SURGICAL HISTORY  12/31/2020    Colonoscopy    OTHER SURGICAL HISTORY  01/19/2020    Right hemicolectomy        Social History  She reports that she has never smoked. She has never used smokeless tobacco. She reports that she does not currently use alcohol. She reports that she does not use drugs.    Family History  Not pertinent to HPI per the patient     Allergies  Codeine, Hydrocodone-acetaminophen, Minocycline, Oxaliplatin, Oxycodone, Penicillins, Celebrex [celecoxib], and Latex    Review of Systems  All other 12 point review systems negative except HPI  Physical Exam   General Appearance: AAO x 3, not in acute distress  Skin: skin color pink, warm, and dry; no suspicious rashes or lesions  Eyes : PERRL, EOM's intact  ENT: mucous membranes pink and moist  Neck: normocephalic  Respiratory: lungs clear to auscultation anteriorly; no wheezing, rhonchi, or crackles.   Heart: regular rate and rhythm. telemetry shows sinus rhythm  Abdomen: Nondistended, positive bowel sounds x4, soft,  nontender  Extremities: no edema   Peripheral pulses: normal x4 extremities  Neuro: alert, coherent and conversant, no focal motor deficits  Last Recorded Vitals  Blood pressure 135/90, pulse 64, temperature 36.9 °C (98.4 °F), resp. rate 18, height 1.575 m (5' 2\"), weight 53 kg (116 lb 13.5 oz), SpO2 94 %.    Relevant Results  Scheduled medications  aspirin, 81 mg, oral, Daily  atorvastatin, 40 mg, oral, Nightly  azithromycin, 500 mg, oral, q24h PAUL  budesonide-formoteroL, 2 puff, inhalation, BID  cefTRIAXone, 2 g, intravenous, q24h  ipratropium-albuteroL, 3 mL, nebulization, q4h  metoprolol tartrate, 50 mg, oral, q12h  pantoprazole, 40 mg, oral, Daily before breakfast   Or  pantoprazole, 40 mg, intravenous, Daily before breakfast  polyethylene glycol, 17 g, oral, Daily  potassium chloride CR, 20 mEq, oral, " Daily  predniSONE, 40 mg, oral, Daily      Continuous medications  heparin, 0-4,000 Units/hr, Last Rate: 790 Units/hr (12/27/23 2218)      PRN medications  PRN medications: acetaminophen **OR** acetaminophen **OR** acetaminophen, acetaminophen **OR** acetaminophen **OR** acetaminophen, heparin, ondansetron ODT **OR** ondansetron, oxygen    Results for orders placed or performed during the hospital encounter of 12/27/23 (from the past 24 hour(s))   CBC and Auto Differential   Result Value Ref Range    WBC 12.0 (H) 4.4 - 11.3 x10*3/uL    nRBC 0.0 0.0 - 0.0 /100 WBCs    RBC 5.94 (H) 4.00 - 5.20 x10*6/uL    Hemoglobin 18.1 (H) 12.0 - 16.0 g/dL    Hematocrit 55.3 (H) 36.0 - 46.0 %    MCV 93 80 - 100 fL    MCH 30.5 26.0 - 34.0 pg    MCHC 32.7 32.0 - 36.0 g/dL    RDW 14.0 11.5 - 14.5 %    Platelets 241 150 - 450 x10*3/uL    Neutrophils % 81.2 40.0 - 80.0 %    Immature Granulocytes %, Automated 0.5 0.0 - 0.9 %    Lymphocytes % 5.7 13.0 - 44.0 %    Monocytes % 12.1 2.0 - 10.0 %    Eosinophils % 0.1 0.0 - 6.0 %    Basophils % 0.4 0.0 - 2.0 %    Neutrophils Absolute 9.77 (H) 1.60 - 5.50 x10*3/uL    Immature Granulocytes Absolute, Automated 0.06 0.00 - 0.50 x10*3/uL    Lymphocytes Absolute 0.68 (L) 0.80 - 3.00 x10*3/uL    Monocytes Absolute 1.45 (H) 0.05 - 0.80 x10*3/uL    Eosinophils Absolute 0.01 0.00 - 0.40 x10*3/uL    Basophils Absolute 0.05 0.00 - 0.10 x10*3/uL   Comprehensive Metabolic Panel   Result Value Ref Range    Glucose 149 (H) 74 - 99 mg/dL    Sodium 133 (L) 136 - 145 mmol/L    Potassium 4.3 3.5 - 5.3 mmol/L    Chloride 91 (L) 98 - 107 mmol/L    Bicarbonate 33 (H) 21 - 32 mmol/L    Anion Gap 13 10 - 20 mmol/L    Urea Nitrogen 27 (H) 6 - 23 mg/dL    Creatinine 0.69 0.50 - 1.05 mg/dL    eGFR 86 >60 mL/min/1.73m*2    Calcium 10.8 (H) 8.6 - 10.3 mg/dL    Albumin 3.9 3.4 - 5.0 g/dL    Alkaline Phosphatase 104 33 - 136 U/L    Total Protein 6.8 6.4 - 8.2 g/dL    AST 31 9 - 39 U/L    Bilirubin, Total 1.8 (H) 0.0 - 1.2  mg/dL    ALT 23 7 - 45 U/L   Magnesium   Result Value Ref Range    Magnesium 2.19 1.60 - 2.40 mg/dL   Troponin I, High Sensitivity, Initial   Result Value Ref Range    Troponin I, High Sensitivity 1,058 (HH) 0 - 13 ng/L   B-type natriuretic peptide   Result Value Ref Range    BNP 2,894 (H) 0 - 99 pg/mL   Sars-CoV-2 and Influenza A/B PCR   Result Value Ref Range    Flu A Result Not Detected Not Detected    Flu B Result Not Detected Not Detected    Coronavirus 2019, PCR Not Detected Not Detected   Blood Gas Arterial   Result Value Ref Range    POCT pH, Arterial 7.40 7.38 - 7.42 pH    POCT pCO2, Arterial 63 (H) 38 - 42 mm Hg    POCT pO2, Arterial 72 (L) 85 - 95 mm Hg    POCT SO2, Arterial 95 94 - 100 %    POCT Oxy Hemoglobin, Arterial 91.3 (L) 94.0 - 98.0 %    POCT Base Excess, Arterial 11.5 (H) -2.0 - 3.0 mmol/L    POCT HCO3 Calculated, Arterial 39.0 (H) 22.0 - 26.0 mmol/L    Patient Temperature 37.0 degrees Celsius    FiO2 35 %   Blood Culture    Specimen: Peripheral Venipuncture; Blood culture   Result Value Ref Range    Blood Culture Loaded on Instrument - Culture in progress    aPTT   Result Value Ref Range    aPTT 22 (L) 27 - 38 seconds   Protime-INR   Result Value Ref Range    Protime 13.3 (H) 9.8 - 12.8 seconds    INR 1.2 (H) 0.9 - 1.1   D-Dimer, VTE Exclusion   Result Value Ref Range    D-Dimer, Quantitative VTE Exclusion 2,269 (H) <=500 ng/mL FEU   Troponin, High Sensitivity, 1 Hour   Result Value Ref Range    Troponin I, High Sensitivity 814 (HH) 0 - 13 ng/L   Lactate   Result Value Ref Range    Lactate 1.6 0.4 - 2.0 mmol/L   Blood Culture    Specimen: Peripheral Venipuncture; Blood culture   Result Value Ref Range    Blood Culture Loaded on Instrument - Culture in progress    Lavender Top   Result Value Ref Range    Extra Tube Hold for add-ons.    SST TOP   Result Value Ref Range    Extra Tube Hold for add-ons.    Troponin I, High Sensitivity   Result Value Ref Range    Troponin I, High Sensitivity 1,039  (HH) 0 - 13 ng/L   Heparin Assay, UFH   Result Value Ref Range    Heparin Unfractionated <0.1 See Comment Below for Therapeutic Ranges IU/mL   Heparin Assay, UFH   Result Value Ref Range    Heparin Unfractionated 0.3 See Comment Below for Therapeutic Ranges IU/mL   Troponin I, High Sensitivity   Result Value Ref Range    Troponin I, High Sensitivity 703 (HH) 0 - 13 ng/L   CBC   Result Value Ref Range    WBC 5.4 4.4 - 11.3 x10*3/uL    nRBC 0.0 0.0 - 0.0 /100 WBCs    RBC 5.07 4.00 - 5.20 x10*6/uL    Hemoglobin 15.4 12.0 - 16.0 g/dL    Hematocrit 47.2 (H) 36.0 - 46.0 %    MCV 93 80 - 100 fL    MCH 30.4 26.0 - 34.0 pg    MCHC 32.6 32.0 - 36.0 g/dL    RDW 13.5 11.5 - 14.5 %    Platelets 192 150 - 450 x10*3/uL   Comprehensive metabolic panel   Result Value Ref Range    Glucose 143 (H) 74 - 99 mg/dL    Sodium 133 (L) 136 - 145 mmol/L    Potassium 4.2 3.5 - 5.3 mmol/L    Chloride 96 (L) 98 - 107 mmol/L    Bicarbonate 32 21 - 32 mmol/L    Anion Gap 9 (L) 10 - 20 mmol/L    Urea Nitrogen 21 6 - 23 mg/dL    Creatinine 0.49 (L) 0.50 - 1.05 mg/dL    eGFR >90 >60 mL/min/1.73m*2    Calcium 8.9 8.6 - 10.3 mg/dL    Albumin 3.0 (L) 3.4 - 5.0 g/dL    Alkaline Phosphatase 68 33 - 136 U/L    Total Protein 5.1 (L) 6.4 - 8.2 g/dL    AST 21 9 - 39 U/L    Bilirubin, Total 0.7 0.0 - 1.2 mg/dL    ALT 16 7 - 45 U/L   Transthoracic Echo (TTE) Complete   Result Value Ref Range    BSA 1.52 m2   ECG 12 Lead   Result Value Ref Range    Ventricular Rate 134 BPM    Atrial Rate 134 BPM    ID Interval 166 ms    QRS Duration 126 ms    QT Interval 334 ms    QTC Calculation(Bazett) 498 ms    P Axis 95 degrees    R Axis 119 degrees    T Axis -72 degrees    QRS Count 22 beats    Q Onset 193 ms    P Onset 110 ms    P Offset 154 ms    T Offset 360 ms    QTC Fredericia 436 ms   ECG 12 Lead   Result Value Ref Range    Ventricular Rate 127 BPM    Atrial Rate 127 BPM    ID Interval 166 ms    QRS Duration 130 ms    QT Interval 334 ms    QTC Calculation(Bazett) 485  ms    P Axis 78 degrees    R Axis 147 degrees    T Axis -23 degrees    QRS Count 21 beats    Q Onset 196 ms    P Onset 113 ms    P Offset 170 ms    T Offset 363 ms    QTC Fredericia 429 ms    ECG 12 Lead    Result Date: 12/28/2023  Sinus tachycardia with Premature supraventricular complexes and with occasional Premature ventricular complexes Possible Left atrial enlargement Right bundle branch block , plus right ventricular hypertrophy Left posterior fascicular block  Bifascicular block  T wave abnormality, consider inferior ischemia Abnormal ECG When compared with ECG of 27-DEC-2023 13:03, (unconfirmed) Premature ventricular complexes are now Present Left posterior fascicular block is now Present    ECG 12 Lead    Result Date: 12/28/2023   Suspect arm lead reversal, interpretation assumes no reversal Sinus tachycardia with Premature atrial complexes Right bundle branch block , plus right ventricular hypertrophy Septal infarct (cited on or before 21-SEP-2023) T wave abnormality, consider inferior ischemia Abnormal ECG When compared with ECG of 21-SEP-2023 15:15, Significant changes have occurred    CT angio chest for pulmonary embolism    Result Date: 12/27/2023  Interpreted By:  Cooper Bansal, STUDY: CT ANGIO CHEST FOR PULMONARY EMBOLISM;  12/27/2023 3:04 pm   INDICATION: Signs/Symptoms:SOB, hypoxic.   COMPARISON: 07/30/2020   ACCESSION NUMBER(S): [id number]   ORDERING CLINICIAN: ORLANDO SALAMANCA   TECHNIQUE: Helical data acquisition of the chest was obtained after intravenous administration of 68 cc Omnipaque 350 as per PE protocol. Images were reformatted in coronal and sagittal planes. Axial and coronal maximum intensity projection (MIP) images were created and reviewed.   FINDINGS: No filling defects in the pulmonary arteries to suggest pulmonary embolism. Mildly enlarged right and left main pulmonary arteries, likely due to pulmonary hypertension. Atherosclerosis of the thoracic aorta and coronary  arteries noted. Borderline cardiomegaly. No pericardial effusion. Trace left-sided layering pleural effusion. Calcified mediastinal lymph nodes. No adenopathy. Moderate centrilobular emphysematous changes in the bilateral lungs. Small subsegmental densities noted in the lingula, due to atelectasis, aspiration, or pneumonia. Mild bibasilar dependent atelectasis. A stable 0.8 cm benign nodule in the right upper lobe, image number 44 no new pulmonary nodule or mass. Sections through the upper abdomen demonstrate intact visualized abdominal viscera. Multilevel thoracic spondylosis. Diffuse osteopenia. No acute fracture or traumatic subluxation in the thoracic cage.       1. No evidence of pulmonary embolism. 2. COPD with pulmonary hypertension. 3. Subsegmental densities in the lingula, due to atelectasis, aspiration, or pneumonia. 4. Stable benign nodule in the right upper lobe, measuring 0.8 cm. 5. Trace left-sided layering pleural effusion.     MACRO: None   Signed by: Cooper Bansal 12/27/2023 3:50 PM Dictation workstation:   [id number]    XR chest 1 view    Result Date: 12/27/2023  Interpreted By:  Eliel Gracia, STUDY: XR CHEST 1 VIEW;  12/27/2023 1:42 pm   INDICATION: Signs/Symptoms:Chest Pain.   COMPARISON: Most recent prior chest x-rays from 11/04/2021. CT scan from 07/30/2020.   ACCESSION NUMBER(S): [id number]   ORDERING CLINICIAN: ORLANDO SALAMANCA   TECHNIQUE: Single AP portable view of the chest was obtained.   FINDINGS: MEDIASTINUM/ LUNGS/ GLENNY: Mild cardiomegaly, currently without vascular congestion, or pleural effusion. Scant scarring at the lateral lung bases. No new or enlarging opacity in either lung worrisome for tumor or pneumonia. Stable surgical clips along the left lateral chest wall related to the left breast and left axilla. Stable rounded 8 mm left breast calcification consistent with either fat necrosis or small fibroadenoma.. No pneumothorax. No tracheal deviation. No abnormal hilar  fullness or gross mass on either side. Stable calcified lymph nodes in the central mediastinum. Stable calcified lymph nodes in the right hilum.   BONES: No lytic or blastic destructive bone lesion.   UPPER ABDOMEN: Grossly intact.       Stable findings of prior granulomatous disease.   Stable scarring at the lung bases.   Mild cardiomegaly.   Previous surgery involving the left breast and left axilla.   No significant interval change.   Signed by: Eliel Gracia 12/27/2023 1:59 PM Dictation workstation:   [id number]               Assessment/Plan   Principal Problem:    COPD exacerbation (CMS/[facility])    83-year-old female with history of COPD, hypertension anxiety, GERD presented with acute hypoxemic/hypercapnic on chronic hypoxemic/hypercapnic respiratory failure, COPD exacerbation, NSTEMI, possible pneumonia, pulmonary nodule  Plan  Cardiopulmonary monitoring  Pulse ox check  Follow vitals  Daily CBC BMP  DuoNebs every 4 hours and Symbicort inhaler twice daily  Prednisone 40 mg daily  Supplemental oxygen to wean as tolerated  Heparin drip  Echocardiogram  Troponins trending  Aspirin statins  Metoprolol 50 mg p.o. twice daily  Monitor pressure  Address risk factors, education counseling  Supportive care symptomatic management  Tylenol Mucinex  PPI for GI prophylaxis  Ceftriaxone and azithromycin for pneumonia  Follow sputum cultures and procalcitonin level  Continue home medicines as tolerated  Bilateral SCDs for DVT prophylaxis  Further management as clinical course evolves  Transfer to [facility] when bed available for cardiology services  Follow-up pulmonology outpatient    Pebbles Perdue MD

## 2023-12-28 NOTE — PROGRESS NOTES
12/28/23 1130   Discharge Planning   Living Arrangements Children   Support Systems Children;Family members   Assistance Needed none   Type of Residence Private residence   Number of Stairs to Enter Residence 0  (Ramp)   Number of Stairs Within Residence 0   Do you have animals or pets at home? Yes   Type of Animals or Pets 2 dogs   Who is requesting discharge planning? Provider   Home or Post Acute Services None   Patient expects to be discharged to: transfer out   Does the patient need discharge transport arranged? Yes   RoundTrip coordination needed? Yes   Has discharge transport been arranged? No   Financial Resource Strain   How hard is it for you to pay for the very basics like food, housing, medical care, and heating? Not hard   Housing Stability   In the last 12 months, was there a time when you were not able to pay the mortgage or rent on time? N   In the last 12 months, how many places have you lived? 1   In the last 12 months, was there a time when you did not have a steady place to sleep or slept in a shelter (including now)? N   Transportation Needs   In the past 12 months, has lack of transportation kept you from medical appointments or from getting medications? no   In the past 12 months, has lack of transportation kept you from meetings, work, or from getting things needed for daily living? No   Patient Choice   Provider Choice list and CMS website (https://medicare.gov/care-compare#search) for post-acute Quality and Resource Measure Data were provided and reviewed with:   (NA)   Patient / Family choosing to utilize agency / facility established prior to hospitalization No     Met with pt and son at the bedside permission was given to ask questions in son's presence and verified address, phone number and emergency contact information. PCP is [facility] pharmacy is [facility] by mail and [facility] local 1x orders. Pt is on oxygen at home 3-4 L/nc @ HS and PRN it is supplied by [facility]. Pt is independent and feels safe.  Current plan is pending a bed in a higher acuity of care facility. CT to follow. Juliane Foster BSN/RN-TCC

## 2023-12-30 LAB
BACTERIA SPEC RESP CULT: NORMAL
GRAM STN SPEC: NORMAL
GRAM STN SPEC: NORMAL

## 2024-01-01 LAB
BACTERIA BLD CULT: NORMAL
BACTERIA BLD CULT: NORMAL

## 2024-01-02 ENCOUNTER — PATIENT OUTREACH (OUTPATIENT)
Dept: PRIMARY CARE | Facility: CLINIC | Age: 84
End: 2024-01-02
Payer: MEDICARE

## 2024-01-02 RX ORDER — AMOXICILLIN AND CLAVULANATE POTASSIUM 875; 125 MG/1; MG/1
875 TABLET, FILM COATED ORAL 2 TIMES DAILY
COMMUNITY
End: 2024-03-01 | Stop reason: ALTCHOICE

## 2024-01-02 RX ORDER — SPIRONOLACTONE 25 MG/1
12.5 TABLET ORAL DAILY
COMMUNITY

## 2024-01-02 RX ORDER — METOPROLOL SUCCINATE 25 MG/1
25 TABLET, EXTENDED RELEASE ORAL 2 TIMES DAILY
COMMUNITY

## 2024-01-02 NOTE — PROGRESS NOTES
Discharge Facility: [facility]  Discharge Diagnosis: NSTEMI  Admission Date: 12/28/2023  Discharge Date: 12/30/2023    PCP Appointment Date: none  Specialist Appointment Date: cardiology 2/14/2024  Hospital Encounter and Summary: Linked   See discharge assessment below for further details    New Medications     Details   amoxicillin-clavulanate 875-125 mg per tablet  Commonly known as: AUGMENTIN  Take 1 (one) tablet by mouth 2 (two) times a day for 3 days .  Quantity: 6 tablet    aspirin 81 MG EC tablet  Take 1 (one) tablet (81 mg total) by mouth daily .  Quantity: 30 tablet    atorvastatin 40 MG tablet  Commonly known as: LIPITOR  Take 1 (one) tablet (40 mg total) by mouth nightly .  Quantity: 30 tablet    azithromycin 250 MG tablet  Commonly known as: ZITHROMAX  Take 1 (one) tablet (250 mg total) by mouth daily for 3 days .  Quantity: 3 tablet    metoprolol succinate 25 MG 24 hr tablet  Commonly known as: TOPROL-XL  Take 1 (one) tablet (25 mg total) by mouth 2 (two) times a day .  Quantity: 60 tablet    predniSONE 10 MG tablet  Commonly known as: DELTASONE  Take 4 (four) tablets (40 mg total) by mouth daily for 3 days .  Quantity: 12 tablet    sacubitriL-valsartan 24-26 mg per tablet  Commonly known as: ENTRESTO  Take 1 (one) tablet by mouth 2 (two) times a day .  Quantity: 60 tablet    spironolactone 25 MG tablet  Commonly known as: ALDACTONE  Take 0.5 (one-half) tablet (12.5 mg total) by mouth daily .  Quantity: 15 tablet       Stopped Medications     hydroCHLOROthiazide 25 MG tablet  Commonly known as: HYDRODIURIL    metoprolol tartrate 50 MG tablet  Commonly known as: LOPRESSOR    potassium 99 mg Tab     Engagement  Call Start Time: 1143 (1/2/2024 11:43 AM)    Medications  Medications reviewed with patient/caregiver?: Yes (1/2/2024 11:43 AM)  Is the patient having any side effects they believe may be caused by any medication additions or changes?: No (1/2/2024 11:43 AM)  Care Management Interventions: No  "intervention needed (1/2/2024 11:43 AM)  Medication Comments: see med list (1/2/2024 11:43 AM)    Appointments  Does the patient have a primary care provider?: Yes (1/2/2024 11:43 AM)  Care Management Interventions: Advised patient to make appointment (1/2/2024 11:43 AM)  Has the patient kept scheduled appointments due by today?: Yes (1/2/2024 11:43 AM)  Care Management Interventions: Advised patient to keep appointment (1/2/2024 11:43 AM)    Self Management  What is the home health agency?: none (1/2/2024 11:43 AM)  Has home health visited the patient within 72 hours of discharge?: Not applicable (1/2/2024 11:43 AM)    Patient Teaching  Does the patient have access to their discharge instructions?: Yes (1/2/2024 11:43 AM)  Care Management Interventions: Reviewed instructions with patient (1/2/2024 11:43 AM)  What is the patient's perception of their health status since discharge?: Improving (1/2/2024 11:43 AM)  Is the patient/caregiver able to teach back the hierarchy of who to call/visit for symptoms/problems? PCP, Specialist, Home Health nurse, Urgent Care, ED, 911: Yes (1/2/2024 11:43 AM)    Wrap Up  Wrap Up Additional Comments: patient admitted 12/28/2023 to [facility] for NSTEMI. Discharged 12/30/2023. CTS spoke with patient. She stated that she is doing ok. Still feels \"weak and not alot of get up and go\". She is currently using 3-4 liters of oxygen since being home. Patient denies any chest pains or any new shortness of breath. Patient does need an appt with PCP. No appts available. Will task PCP office. Patient stated that she does have all medications that were prescribed. No questions or concerns at this time, (1/2/2024 11:43 AM)  Call End Time: 1149 (1/2/2024 11:43 AM)        "

## 2024-01-08 DIAGNOSIS — J41.0 SIMPLE CHRONIC BRONCHITIS (MULTI): ICD-10-CM

## 2024-01-08 DIAGNOSIS — I73.9 PERIPHERAL VASCULAR DISEASE (CMS-HCC): Primary | ICD-10-CM

## 2024-01-08 RX ORDER — FLUTICASONE FUROATE, UMECLIDINIUM BROMIDE AND VILANTEROL TRIFENATATE 100; 62.5; 25 UG/1; UG/1; UG/1
1 POWDER RESPIRATORY (INHALATION) DAILY
Qty: 60 EACH | Refills: 5 | Status: SHIPPED | OUTPATIENT
Start: 2024-01-08

## 2024-01-10 LAB
ATRIAL RATE: 127 BPM
ATRIAL RATE: 134 BPM
P AXIS: 78 DEGREES
P AXIS: 95 DEGREES
P OFFSET: 154 MS
P OFFSET: 170 MS
P ONSET: 110 MS
P ONSET: 113 MS
PR INTERVAL: 166 MS
PR INTERVAL: 166 MS
Q ONSET: 193 MS
Q ONSET: 196 MS
QRS COUNT: 21 BEATS
QRS COUNT: 22 BEATS
QRS DURATION: 126 MS
QRS DURATION: 130 MS
QT INTERVAL: 334 MS
QT INTERVAL: 334 MS
QTC CALCULATION(BAZETT): 485 MS
QTC CALCULATION(BAZETT): 498 MS
QTC FREDERICIA: 429 MS
QTC FREDERICIA: 436 MS
R AXIS: 119 DEGREES
R AXIS: 147 DEGREES
T AXIS: -23 DEGREES
T AXIS: -72 DEGREES
T OFFSET: 360 MS
T OFFSET: 363 MS
VENTRICULAR RATE: 127 BPM
VENTRICULAR RATE: 134 BPM

## 2024-01-11 ENCOUNTER — TELEPHONE (OUTPATIENT)
Dept: PRIMARY CARE | Facility: CLINIC | Age: 84
End: 2024-01-11
Payer: MEDICARE

## 2024-01-12 ENCOUNTER — PATIENT OUTREACH (OUTPATIENT)
Dept: PRIMARY CARE | Facility: CLINIC | Age: 84
End: 2024-01-12
Payer: MEDICARE

## 2024-01-12 DIAGNOSIS — J43.2 CENTRILOBULAR EMPHYSEMA (MULTI): ICD-10-CM

## 2024-01-12 RX ORDER — ALBUTEROL SULFATE 90 UG/1
2 AEROSOL, METERED RESPIRATORY (INHALATION)
Qty: 18 G | Refills: 3 | Status: SHIPPED | OUTPATIENT
Start: 2024-01-12

## 2024-01-12 NOTE — PROGRESS NOTES
Call regarding 14 days after hospitalization.  At time of outreach call the patient feels as if their condition has improved since last visit. No PCP visit at this time.

## 2024-01-17 ENCOUNTER — APPOINTMENT (OUTPATIENT)
Dept: PRIMARY CARE | Facility: CLINIC | Age: 84
End: 2024-01-17
Payer: MEDICARE

## 2024-01-17 DIAGNOSIS — J41.0 SIMPLE CHRONIC BRONCHITIS (MULTI): ICD-10-CM

## 2024-01-17 RX ORDER — IPRATROPIUM BROMIDE AND ALBUTEROL SULFATE 2.5; .5 MG/3ML; MG/3ML
SOLUTION RESPIRATORY (INHALATION)
Qty: 120 ML | Refills: 3 | Status: SHIPPED | OUTPATIENT
Start: 2024-01-17 | End: 2024-02-15

## 2024-01-30 ENCOUNTER — APPOINTMENT (OUTPATIENT)
Dept: PRIMARY CARE | Facility: CLINIC | Age: 84
End: 2024-01-30
Payer: MEDICARE

## 2024-02-03 DIAGNOSIS — I50.20 UNSPECIFIED SYSTOLIC (CONGESTIVE) HEART FAILURE (MULTI): Primary | ICD-10-CM

## 2024-02-09 ENCOUNTER — PATIENT OUTREACH (OUTPATIENT)
Dept: PRIMARY CARE | Facility: CLINIC | Age: 84
End: 2024-02-09
Payer: MEDICARE

## 2024-02-09 NOTE — PROGRESS NOTES
Call placed regarding one month post discharge follow up call.  At time of outreach call the patient feels as if their condition has improved since initial visit with PCP or specialist.  Questions or concerns regarding recovery period addressed at this time. No questions or concerns  Reviewed any PCP or specialists progress notes/labs/radiology reports if applicable and addressed any questions or concerns.

## 2024-02-15 ENCOUNTER — APPOINTMENT (OUTPATIENT)
Dept: PRIMARY CARE | Facility: CLINIC | Age: 84
End: 2024-02-15
Payer: MEDICARE

## 2024-02-15 DIAGNOSIS — J41.0 SIMPLE CHRONIC BRONCHITIS (MULTI): ICD-10-CM

## 2024-02-15 RX ORDER — IPRATROPIUM BROMIDE AND ALBUTEROL SULFATE 2.5; .5 MG/3ML; MG/3ML
SOLUTION RESPIRATORY (INHALATION)
Qty: 450 ML | Refills: 3 | Status: SHIPPED | OUTPATIENT
Start: 2024-02-15 | End: 2024-03-01 | Stop reason: ALTCHOICE

## 2024-03-01 ENCOUNTER — OFFICE VISIT (OUTPATIENT)
Dept: PRIMARY CARE | Facility: CLINIC | Age: 84
End: 2024-03-01
Payer: MEDICARE

## 2024-03-01 VITALS
HEART RATE: 75 BPM | DIASTOLIC BLOOD PRESSURE: 85 MMHG | WEIGHT: 111 LBS | SYSTOLIC BLOOD PRESSURE: 126 MMHG | HEIGHT: 62 IN | BODY MASS INDEX: 20.43 KG/M2

## 2024-03-01 DIAGNOSIS — K21.9 GASTROESOPHAGEAL REFLUX DISEASE WITHOUT ESOPHAGITIS: ICD-10-CM

## 2024-03-01 DIAGNOSIS — C18.7 MALIGNANT NEOPLASM OF SIGMOID COLON (MULTI): ICD-10-CM

## 2024-03-01 DIAGNOSIS — Z00.00 MEDICARE ANNUAL WELLNESS VISIT, SUBSEQUENT: Primary | ICD-10-CM

## 2024-03-01 DIAGNOSIS — G62.0 NEUROPATHY DUE TO CHEMOTHERAPEUTIC DRUG (MULTI): ICD-10-CM

## 2024-03-01 DIAGNOSIS — I73.9 PERIPHERAL VASCULAR DISEASE (CMS-HCC): ICD-10-CM

## 2024-03-01 DIAGNOSIS — I21.4 NSTEMI (NON-ST ELEVATED MYOCARDIAL INFARCTION) (MULTI): ICD-10-CM

## 2024-03-01 DIAGNOSIS — E21.0 PRIMARY HYPERPARATHYROIDISM (MULTI): ICD-10-CM

## 2024-03-01 DIAGNOSIS — I50.33 CHF (CONGESTIVE HEART FAILURE), NYHA CLASS I, ACUTE ON CHRONIC, DIASTOLIC (MULTI): ICD-10-CM

## 2024-03-01 DIAGNOSIS — F41.9 ANXIETY: ICD-10-CM

## 2024-03-01 DIAGNOSIS — J44.1 COPD EXACERBATION (MULTI): ICD-10-CM

## 2024-03-01 DIAGNOSIS — T45.1X5A NEUROPATHY DUE TO CHEMOTHERAPEUTIC DRUG (MULTI): ICD-10-CM

## 2024-03-01 DIAGNOSIS — E46 PROTEIN-CALORIE MALNUTRITION, UNSPECIFIED SEVERITY (MULTI): ICD-10-CM

## 2024-03-01 DIAGNOSIS — I10 PRIMARY HYPERTENSION: ICD-10-CM

## 2024-03-01 PROCEDURE — 99214 OFFICE O/P EST MOD 30 MIN: CPT | Performed by: INTERNAL MEDICINE

## 2024-03-01 PROCEDURE — G0444 DEPRESSION SCREEN ANNUAL: HCPCS | Performed by: INTERNAL MEDICINE

## 2024-03-01 PROCEDURE — 1160F RVW MEDS BY RX/DR IN RCRD: CPT | Performed by: INTERNAL MEDICINE

## 2024-03-01 PROCEDURE — 1126F AMNT PAIN NOTED NONE PRSNT: CPT | Performed by: INTERNAL MEDICINE

## 2024-03-01 PROCEDURE — 3074F SYST BP LT 130 MM HG: CPT | Performed by: INTERNAL MEDICINE

## 2024-03-01 PROCEDURE — 1159F MED LIST DOCD IN RCRD: CPT | Performed by: INTERNAL MEDICINE

## 2024-03-01 PROCEDURE — 1036F TOBACCO NON-USER: CPT | Performed by: INTERNAL MEDICINE

## 2024-03-01 PROCEDURE — 3079F DIAST BP 80-89 MM HG: CPT | Performed by: INTERNAL MEDICINE

## 2024-03-01 PROCEDURE — G0439 PPPS, SUBSEQ VISIT: HCPCS | Performed by: INTERNAL MEDICINE

## 2024-03-01 RX ORDER — AZITHROMYCIN 500 MG/1
500 TABLET, FILM COATED ORAL DAILY
Qty: 5 TABLET | Refills: 0 | Status: SHIPPED | OUTPATIENT
Start: 2024-03-01 | End: 2024-03-06

## 2024-03-01 NOTE — PROGRESS NOTES
Chief Complaint: Medicare Wellness Exam/Comprehensive Problem Focused Follow Up and Physical Exam    HPI:  Pt is here today for MWV.  Pt reports that she is doing ok.  Back in December she was having a lot of trouble breathing. She went to [facility] and she was diagnosed with PNA, then troponin elevated, was diagnosed with an NSTEMI probably 2/2 demand ischemia and was transferred to [facility] for 3-4 days.  Daughter says she was being very stubborn, refused for a period of time to even come see me vs going to the hospital.    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)    Protein-calorie malnutrition, unspecified severity (CMS/HCC)    Simple chronic bronchitis (CMS/HCC)    Peripheral vascular disease (CMS/HCC)    Primary hyperparathyroidism (CMS/HCC)    Neuropathy associated with cancer (CMS/HCC)    Anxiety    GERD (gastroesophageal reflux disease)    Lung nodule    Senile osteoporosis    HTN (hypertension)    COPD exacerbation (CMS/HCC)    NSTEMI (non-ST elevated myocardial infarction) (CMS/HCC)    Malignant neoplasm of sigmoid colon (CMS/HCC)    CHF (congestive heart failure), NYHA class I, acute on chronic, diastolic (CMS/HCC)       Comprehensive Medical/Surgical/Social/Family History  Past Medical History:   Diagnosis Date    COPD (chronic obstructive pulmonary disease) (CMS/HCC)     Diverticulitis of intestine, part unspecified, without perforation or abscess without bleeding 12/24/2019    Acute diverticulitis    Essential (primary) hypertension 07/20/2022    HTN (hypertension)    Personal history of malignant neoplasm of breast     History of malignant neoplasm of breast    Personal history of other malignant neoplasm of large intestine 03/16/2022    History of malignant neoplasm of colon    Personal history of other malignant neoplasm of large intestine 03/16/2022    History of malignant neoplasm of colon     Past Surgical History:    Procedure Laterality Date    OTHER SURGICAL HISTORY  11/08/2019    Lumpectomy    OTHER SURGICAL HISTORY  11/08/2019    Breast biopsy    OTHER SURGICAL HISTORY  11/08/2019    Cholecystectomy    OTHER SURGICAL HISTORY  12/31/2020    Colonoscopy    OTHER SURGICAL HISTORY  01/19/2020    Right hemicolectomy     Social History     Tobacco Use    Smoking status: Never    Smokeless tobacco: Never   Substance Use Topics    Alcohol use: Not Currently    Drug use: Never     No family history on file.      Allergies and Medications  Codeine, Hydrocodone-acetaminophen, Minocycline, Oxaliplatin, Oxycodone, Penicillins, Celebrex [celecoxib], and Latex  Current Outpatient Medications on File Prior to Visit   Medication Sig Dispense Refill    albuterol 90 mcg/actuation inhaler INHALE 2 PUFFS 4 TIMES A DAY. (Patient taking differently: Inhale 1 puff 3 times a day.) 18 g 3    amoxicillin-pot clavulanate (Augmentin) 875-125 mg tablet Take 1 tablet (875 mg) by mouth 2 times a day. For 3 days      aspirin 81 mg EC tablet Take 1 tablet (81 mg) by mouth once daily. Do not start before December 29, 2023.      atorvastatin (Lipitor) 40 mg tablet Take 1 tablet (40 mg) by mouth once daily at bedtime.      budesonide (Pulmicort) 0.25 mg/2 mL nebulizer solution Take 2 mL (0.25 mg) by nebulization 2 times a day. Rinse mouth with water after use to reduce aftertaste and incidence of candidiasis. Do not swallow. (Patient not taking: Reported on 3/1/2024)      budesonide-formoteroL (Symbicort) 80-4.5 mcg/actuation inhaler Inhale 2 puffs 2 times a day. 3 each 11    Combivent Respimat  mcg/actuation inhaler INHALE 1 PUFF 4 TIMES DAILY (MAXIMUM OF 6 PUFFS IN 24 HOURS) 12 g 10    fluticasone-umeclidin-vilanter (Trelegy Ellipta) 100-62.5-25 mcg blister with device Inhale 1 puff once daily. 60 each 5    formoterol (Perforomist) 20 mcg/2 mL nebulizer solution Take 2 mL (20 mcg) by nebulization 2 times a day. (Patient not taking: Reported on  3/1/2024)      metoprolol succinate XL (Toprol-XL) 25 mg 24 hr tablet Take 1 tablet (25 mg) by mouth 2 times a day. Do not crush or chew.      oxygen (O2) gas therapy Inhale 1 each once every 24 hours.      potassium chloride CR (Klor-Con M20) 20 mEq ER tablet 1 tablet (20 mEq) once daily as needed. When taking HCTZ      propylene glycol (SYSTANE COMPLETE OPHT) Administer 1 drop into affected eye(s) 3 times a day.      sacubitriL-valsartan (Entresto) 24-26 mg tablet Take 1 tablet by mouth 2 times a day.      spironolactone (Aldactone) 25 mg tablet Take 0.5 tablets (12.5 mg) by mouth once daily.      [DISCONTINUED] azithromycin (Zithromax) 500 mg tablet Take 1 tablet (500 mg) by mouth once every 24 hours. Do not start before December 29, 2023. (Patient not taking: Reported on 3/1/2024)      [DISCONTINUED] cefTRIAXone (Rocephin) 2 gram/50 mL IV Infuse 50 mL (2 g) at 100 mL/hr over 30 minutes into a venous catheter once every 24 hours. (Patient not taking: Reported on 3/1/2024)      [DISCONTINUED] heparin sodium,porcine/D5W (heparin, porcine,) 25,000 unit/250 mL(100 unit/mL) parenteral solution in D5W infusion Infuse 0-4,000 Units/hr at 0-40 mL/hr into a venous catheter continuously. (Patient not taking: Reported on 3/1/2024)      [DISCONTINUED] hydroCHLOROthiazide (HYDRODiuril) 25 mg tablet TAKE 1 TABLET EVERY DAY AS NEEDED FOR SWELLING (Patient not taking: Reported on 1/2/2024) 90 tablet 3    [DISCONTINUED] ibandronate (Boniva) 150 mg tablet TAKE 1 TABLET ONCE MONTHLY. (Patient not taking: Reported on 3/1/2024) 3 tablet 1    [DISCONTINUED] ipratropium-albuteroL (Duo-Neb) 0.5-2.5 mg/3 mL nebulizer solution INHALE THE CONTENTS OF 1 VIAL VIA NEBULIZER FOUR TIMES DAILY (Patient not taking: Reported on 3/1/2024) 450 mL 3    [DISCONTINUED] metoprolol tartrate (Lopressor) 50 mg tablet TAKE 1 TABLET EVERY 12 HOURS (Patient not taking: Reported on 1/2/2024) 180 tablet 2    [DISCONTINUED] predniSONE (Deltasone) 20 mg tablet  Take 2 tablets (40 mg) by mouth once daily. Do not start before December 29, 2023. (Patient not taking: Reported on 3/1/2024)       No current facility-administered medications on file prior to visit.       Medicare Wellness Questionnaire        How have you been on Medicare less than a year ? No  Have you had a Medicare Wellness exam before ? Yes  Have you had any surgeries in the last year ? Yes  Have you developed any new diseases in the last year ? No  Have any close family members developed new diseases in the last year ? No  Have you been to a [facility] hospital in the last year ? Yes  Do you take any pills or supplements other than those prescribed for you ? Yes  Do you take any opiates for pain such as Tramadol, Percocet or Norco ? No  How do you consider your overall health ?Good  Have you ever used tobacco products ? Yes   Have you smoked more than 100 cigarettes in your life ?  Yes  What form of tobacco have you used ? Cigarettes  How many packs per day ? 1 ppd How many years ? 30  Have you quit ? Yes  Would you like to quit ? Yes  Do you drink alcohol ?  No   Have you ever used illegal drugs at anytime in your life including Marijuana ? No   Which of the following describes your diet ?Well balanced  How many days per week on average do you exercise ? 0 cardio exercise  All days  Do you have any loss of hearing ? No  Do you have hearing aids ? No  Have you or others noted you have loss of memory ? No  Do you need someone to assist you with any of the following ?  Which did she None issues  Do you need someone to assist you with any of the following ?  Have you fallen in the last 6 months None   Have you fallen in the last 6 months ? No  Do you have any of the following in your house ? None you have a living will or healthcare power of  set up  Do you have a living will ?  I didYes  Do you have a durable power of  for health care decisions ?  Has a copyYes  A copy  Medications and Supplements   "prescribed by me and other practitioners or clinical pharmacist (such as prescriptions, OTC's, herbal therapies and supplements) were reviewed and documented in the medical record.    Tobacco/Alcohol/Opioid use, as well as Illicit Drug Use was screened for/reviewed and documented in Social History section and medication list as appropriate  Activities of Daily Living  In your present state of health, do you have any difficulty performing the following activities?:   Preparing food and eating?: No  Bathing yourself: No  Getting dressed: No  Using the toilet:No  Moving around from place to place: No  In the past year have you fallen or had a near fall?:No    Depression Screen  (Note: if answer to either of the following is \"Yes\", then a more complete depression screening is indicated)   Q1: Over the past two weeks, have you felt down, depressed or hopeless?   No   Q2: Over the past two weeks, have you felt little interest or pleasure in doing things?   No     Current exercise habits: The patient does not participate in regular exercise at present.   Dietary issues discussed: Yes  Hearing difficulties: No  Safe in current home environment: yes  Visual Acuity assessed: no  Cognitive Impairment assessed: yes       Advance directives  Advanced Care Planning (including a Living Will, Healthcare POA, as well as specific end of life choices and/or directives), was discussed for approximately 1minutes with the patient and/or surrogate, voluntarily, and documented in the medical record.     Cardiac Risk Assessment  Cardiovascular risk was discussed and, if needed, lifestyle modifications recommended, including nutritional choices, exercise, and elimination of habits contributing to risk. We agreed on a plan to reduce the current cardiovascular risk based on above discussion as needed.  Aspirin use/disuse was discussed after reviewing the updated guidelines below:    Consider low dose Aspirin ( mg) use if the benefit for " "cardiovascular disease prevention outweighs risk for bleeding complications.   In general, low dose ASA should be considered:  In patients WITHOUT prior MI/stroke/PAD (primary prevention):   a. Age <60: Use if 10-year cardiovascular disease risk >20%, with discussion of risks and benefits with patient  b. Age 60-<70: Use if 10-year cardiovascular disease risk >20% and low bleeding (e.g., gastrointenstinal) risk, with discussion of risks and benefits with patient  c. Age >=70: Do not use    In patients WITH prior MI/stroke/PAD (secondary prevention):   Generally use unless extremely high bleeding (e.g., gastrointenstinal) risk, with discussion of risks and benefits with patient    ROS otherwise negative aside from what was mentioned above in HPI.    Vitals  /85   Pulse 75   Ht 1.575 m (5' 2\")   Wt 50.3 kg (111 lb)   BMI 20.30 kg/m²   Body mass index is 20.3 kg/m².  Physical Exam  Gen: Alert, NAD  HEENT:  PERRLA, EOMI, conjunctiva and sclera normal in appearance.   Neck:  Supple with FROM; No masses/nodes palpable; Thyroid nontender and without nodules; No MARGO  Respiratory:  Lungs CTAB  Cardiovascular:  Heart RRR. No M/R/G. 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)    Protein-calorie malnutrition, unspecified severity (CMS/HCC)    Peripheral vascular disease (CMS/HCC)    Primary hyperparathyroidism (CMS/HCC)    Anxiety    GERD (gastroesophageal reflux disease)    HTN (hypertension)    COPD exacerbation (CMS/HCC) - Primary    Relevant Medications    azithromycin (Zithromax) 500 mg tablet    NSTEMI (non-ST elevated " myocardial infarction) (CMS/HCC)    Malignant neoplasm of sigmoid colon (CMS/HCC)    CHF (congestive heart failure), NYHA class I, acute on chronic, diastolic (CMS/HCC)     Hospital follow up in Dec 23 for PNA and nstemi  - reviewed hospital records  -following with Dr Liz    2. Hx of nstemi, from demand ischemia from pna, CHF, mild to mod TER  - following with cardiology  - continue asa   - cont metoprolol   - continue entresto    3. COPD   - sent azithromycin as she is having change in sputum  - continue current inhalers     4. Hx of colon cancer   - discussed doing follow up CT abd and pelvis and CEA etc as was recommended,  she had told me before that if she had a recurrence of colon cancer she did not want to pursue anything further. Discussed that even with her weight is stable that is always a concern if it has not recurred she is going to think about whether she would want follow-up evaluation with CT and blood work for this    During the course of the visit the patient was educated and counseled about age appropriate screening and preventive services. Completed preventive screenings were documented in the chart and orders were placed for outstanding screenings/procedures as documented in the Assessment and Plan.      Patient Instructions (the written plan) was given to the patient at check out.      Mindy Mayberry,

## 2024-03-13 ENCOUNTER — PATIENT OUTREACH (OUTPATIENT)
Dept: PRIMARY CARE | Facility: CLINIC | Age: 84
End: 2024-03-13
Payer: MEDICARE

## 2024-05-31 ENCOUNTER — TELEPHONE (OUTPATIENT)
Dept: PRIMARY CARE | Facility: CLINIC | Age: 84
End: 2024-05-31
Payer: MEDICARE

## 2024-05-31 DIAGNOSIS — K21.9 GASTROESOPHAGEAL REFLUX DISEASE WITHOUT ESOPHAGITIS: Primary | ICD-10-CM

## 2024-05-31 RX ORDER — OMEPRAZOLE 40 MG/1
40 CAPSULE, DELAYED RELEASE ORAL
Qty: 30 CAPSULE | Refills: 0 | Status: SHIPPED | OUTPATIENT
Start: 2024-05-31

## 2024-05-31 RX ORDER — ONDANSETRON 4 MG/1
4 TABLET, ORALLY DISINTEGRATING ORAL EVERY 8 HOURS PRN
Qty: 20 TABLET | Refills: 0 | Status: SHIPPED | OUTPATIENT
Start: 2024-05-31 | End: 2024-06-07

## 2024-05-31 NOTE — TELEPHONE ENCOUNTER
Called pt and she said the dizziness has stopped just has a little bit of an upset stomach.  Told her to try some crackers, she said she would.

## 2024-05-31 NOTE — TELEPHONE ENCOUNTER
Pt called states has started to have dizziness and upset stomach yesterday and wanted to know if she should start taking the antibiotic to help with that.  She said the cough and sputum is better so she had not started the antibiotic.  Please advise

## 2024-09-03 ENCOUNTER — APPOINTMENT (OUTPATIENT)
Dept: PRIMARY CARE | Facility: CLINIC | Age: 84
End: 2024-09-03
Payer: MEDICARE

## 2024-10-13 DIAGNOSIS — I10 PRIMARY HYPERTENSION: Primary | ICD-10-CM

## 2024-10-14 RX ORDER — HYDROCHLOROTHIAZIDE 25 MG/1
TABLET ORAL
Qty: 90 TABLET | Refills: 3 | Status: SHIPPED | OUTPATIENT
Start: 2024-10-14

## 2024-11-04 DIAGNOSIS — J43.2 CENTRILOBULAR EMPHYSEMA (MULTI): ICD-10-CM

## 2024-11-04 DIAGNOSIS — J41.0 SIMPLE CHRONIC BRONCHITIS (MULTI): ICD-10-CM

## 2024-11-04 RX ORDER — FLUTICASONE FUROATE, UMECLIDINIUM BROMIDE AND VILANTEROL TRIFENATATE 100; 62.5; 25 UG/1; UG/1; UG/1
POWDER RESPIRATORY (INHALATION)
Qty: 60 EACH | Refills: 3 | Status: SHIPPED | OUTPATIENT
Start: 2024-11-04

## 2024-11-04 RX ORDER — ALBUTEROL SULFATE 90 UG/1
2 INHALANT RESPIRATORY (INHALATION)
Qty: 18 G | Refills: 3 | Status: SHIPPED | OUTPATIENT
Start: 2024-11-04

## 2024-11-05 ENCOUNTER — APPOINTMENT (OUTPATIENT)
Dept: PRIMARY CARE | Facility: CLINIC | Age: 84
End: 2024-11-05
Payer: MEDICARE

## 2024-11-05 VITALS
SYSTOLIC BLOOD PRESSURE: 138 MMHG | WEIGHT: 117 LBS | OXYGEN SATURATION: 84 % | DIASTOLIC BLOOD PRESSURE: 75 MMHG | HEIGHT: 62 IN | HEART RATE: 102 BPM | BODY MASS INDEX: 21.53 KG/M2

## 2024-11-05 DIAGNOSIS — E55.9 VITAMIN D DEFICIENCY: ICD-10-CM

## 2024-11-05 DIAGNOSIS — J96.11 CHRONIC HYPOXIC RESPIRATORY FAILURE, ON HOME OXYGEN THERAPY (MULTI): ICD-10-CM

## 2024-11-05 DIAGNOSIS — I73.9 PERIPHERAL VASCULAR DISEASE (CMS-HCC): ICD-10-CM

## 2024-11-05 DIAGNOSIS — G63 NEUROPATHY ASSOCIATED WITH CANCER (MULTI): ICD-10-CM

## 2024-11-05 DIAGNOSIS — E46 PROTEIN-CALORIE MALNUTRITION, UNSPECIFIED SEVERITY (MULTI): ICD-10-CM

## 2024-11-05 DIAGNOSIS — I50.33 CHF (CONGESTIVE HEART FAILURE), NYHA CLASS I, ACUTE ON CHRONIC, DIASTOLIC: ICD-10-CM

## 2024-11-05 DIAGNOSIS — C80.1 NEUROPATHY ASSOCIATED WITH CANCER (MULTI): ICD-10-CM

## 2024-11-05 DIAGNOSIS — R41.3 MEMORY CHANGES: Primary | ICD-10-CM

## 2024-11-05 DIAGNOSIS — J41.0 SIMPLE CHRONIC BRONCHITIS (MULTI): ICD-10-CM

## 2024-11-05 DIAGNOSIS — I10 PRIMARY HYPERTENSION: ICD-10-CM

## 2024-11-05 DIAGNOSIS — Z99.81 CHRONIC HYPOXIC RESPIRATORY FAILURE, ON HOME OXYGEN THERAPY (MULTI): ICD-10-CM

## 2024-11-05 DIAGNOSIS — I21.4 NSTEMI (NON-ST ELEVATED MYOCARDIAL INFARCTION) (MULTI): ICD-10-CM

## 2024-11-05 PROBLEM — J44.9 COPD (CHRONIC OBSTRUCTIVE PULMONARY DISEASE) (MULTI): Status: ACTIVE | Noted: 2023-12-27

## 2024-11-05 PROCEDURE — 3078F DIAST BP <80 MM HG: CPT | Performed by: INTERNAL MEDICINE

## 2024-11-05 PROCEDURE — 99214 OFFICE O/P EST MOD 30 MIN: CPT | Performed by: INTERNAL MEDICINE

## 2024-11-05 PROCEDURE — 3075F SYST BP GE 130 - 139MM HG: CPT | Performed by: INTERNAL MEDICINE

## 2024-11-05 RX ORDER — WARFARIN SODIUM 5 MG/1
5 TABLET ORAL
COMMUNITY
Start: 2024-10-30

## 2024-11-05 RX ORDER — LOSARTAN POTASSIUM 25 MG/1
25 TABLET ORAL
COMMUNITY
Start: 2024-04-12 | End: 2025-04-12

## 2024-11-05 ASSESSMENT — ENCOUNTER SYMPTOMS
BACK PAIN: 0
NAUSEA: 0
APPETITE CHANGE: 0
VOMITING: 0
ABDOMINAL DISTENTION: 0
CHILLS: 0
SHORTNESS OF BREATH: 1
NUMBNESS: 0
ACTIVITY CHANGE: 0
COUGH: 0
FATIGUE: 1
DIARRHEA: 0
SINUS PAIN: 0
WEAKNESS: 0
SINUS PRESSURE: 0
SORE THROAT: 0
WHEEZING: 0

## 2024-11-05 NOTE — PROGRESS NOTES
"Subjective   Patient ID: Melissa Goodwin is a 84 y.o. female who presents for Follow-up (6 month) and Memory Loss.  Memory Loss      Pt is here today for 6 mo follow up with concerns of memory loss.     Pt was admitted to the hospital last 12.23 with NSTEMI, and PNA and she has been on increasing oxygen demands since then, now requiring 5.5L constantly at home, used to see Dr dominguez but has not seen a new pulmonologist since he retired. After that episode of PNA her lungs have never improved back to baseline with her underlying copd.     Her initial pulse o was 60% in the office, after she settled down increased to 91% on 4L with her inogen. I think it cannot keep up with her current oxygen demands and recommend changing to portable oxygen bottles that she can do constant flow between 5-6L.     She has gained about 6 lbs since March.     Review of Systems   Constitutional:  Positive for fatigue. Negative for activity change, appetite change and chills.   HENT:  Negative for congestion, postnasal drip, sinus pressure, sinus pain and sore throat.    Respiratory:  Positive for shortness of breath. Negative for cough and wheezing.    Cardiovascular:  Negative for chest pain and leg swelling.   Gastrointestinal:  Negative for abdominal distention, diarrhea, nausea and vomiting.   Musculoskeletal:  Negative for back pain.   Neurological:  Negative for weakness and numbness.       Objective   /75   Pulse 102   Ht 1.575 m (5' 2\")   Wt 53.1 kg (117 lb)   SpO2 (!) 84%   BMI 21.40 kg/m²     Physical Exam  Constitutional:       General: She is not in acute distress.     Appearance: Normal appearance.   HENT:      Head: Normocephalic.      Nose: Nose normal.      Mouth/Throat:      Pharynx: No oropharyngeal exudate.   Eyes:      General:         Right eye: No discharge.         Left eye: No discharge.      Extraocular Movements: Extraocular movements intact.      Pupils: Pupils are equal, round, and reactive to " light.   Cardiovascular:      Rate and Rhythm: Normal rate. Rhythm irregular.      Heart sounds: No murmur heard.     No gallop.   Pulmonary:      Effort: Pulmonary effort is normal. No respiratory distress.      Breath sounds: Normal breath sounds. No wheezing.   Abdominal:      General: Bowel sounds are normal. There is no distension.      Palpations: Abdomen is soft.      Tenderness: There is no abdominal tenderness.   Musculoskeletal:         General: No swelling. Normal range of motion.      Comments: Purplish discoloration to mukul feet,pulses palpable    Skin:     General: Skin is warm and dry.      Coloration: Skin is not jaundiced.   Neurological:      General: No focal deficit present.      Mental Status: She is alert and oriented to person, place, and time.      Cranial Nerves: No cranial nerve deficit.   Psychiatric:         Mood and Affect: Mood normal.         Behavior: Behavior normal.           Assessment/Plan   Problem List Items Addressed This Visit       Protein-calorie malnutrition, unspecified severity (Multi)    Peripheral vascular disease (CMS-HCC)    Neuropathy associated with cancer (Multi)    HTN (hypertension)    COPD (chronic obstructive pulmonary disease) (Multi)    NSTEMI (non-ST elevated myocardial infarction) (Multi)    CHF (congestive heart failure), NYHA class I, acute on chronic, diastolic     Other Visit Diagnoses       Memory changes    -  Primary    Relevant Orders    Vitamin B12    TSH with reflex to Free T4 if abnormal    Comprehensive Metabolic Panel    CBC and Auto Differential    Vitamin D 25-Hydroxy,Total (for eval of Vitamin D levels)    Urinalysis with Reflex Microscopic    Urine Culture    Vitamin D deficiency        Relevant Orders    Vitamin D 25-Hydroxy,Total (for eval of Vitamin D levels)    Chronic hypoxic respiratory failure, on home oxygen therapy (Multi)               New onset A fib,  Hx of nstemi, from demand ischemia from pna, CHF, mild to mod TER  - following  with cardiology  - on warfarin because Eliquis was too expensive   - continue aldactone  - continue asa   - cont metoprolol   - off entresto because of cost      3. Severe COPD , chronic respiratory failure  - continue trelegy   - Supposed to be on 5.5L of NC o2 24/7  She has inogen but that is not keeping up with her oxygen demand so recommend changing to portable bottles than can titrate between 5-6L of constant flow when outside the home  - instructed daughter to make sure oxygen stays between 89-93 as too much oxygen is also not good    - offered pulmonary referral but declines      4. Hx of colon cancer   -  she had told me before that if she had a recurrence of colon cancer she did not want to pursue anything further.     5. Memory changes, may be 2/2 hypoxia  - will check cbc, cmp, tsh, b12 and vit d, ua and culture   - if ok can try aricept     Final diagnoses:   [R41.3] Memory changes   [E55.9] Vitamin D deficiency   [I50.33] CHF (congestive heart failure), NYHA class I, acute on chronic, diastolic   [I10] Primary hypertension   [I21.4] NSTEMI (non-ST elevated myocardial infarction) (Multi)   [I73.9] Peripheral vascular disease (CMS-HCC)   [E46] Protein-calorie malnutrition, unspecified severity (Multi)   [C80.1, G63] Neuropathy associated with cancer (Multi)   [J41.0] Simple chronic bronchitis (Multi)   [J96.11, Z99.81] Chronic hypoxic respiratory failure, on home oxygen therapy (Multi)

## 2024-11-07 ENCOUNTER — TELEPHONE (OUTPATIENT)
Dept: PRIMARY CARE | Facility: CLINIC | Age: 84
End: 2024-11-07
Payer: MEDICARE

## 2024-11-07 NOTE — TELEPHONE ENCOUNTER
Medium sized tanks were dropped off at the patients house; they needed the mini tanks so the patient can carry them since the portable concentrator is not able to keep up with her breathing

## 2024-11-18 ENCOUNTER — TELEPHONE (OUTPATIENT)
Dept: PRIMARY CARE | Facility: CLINIC | Age: 84
End: 2024-11-18
Payer: MEDICARE

## 2024-11-18 ENCOUNTER — LAB (OUTPATIENT)
Dept: LAB | Facility: LAB | Age: 84
End: 2024-11-18
Payer: MEDICARE

## 2024-11-18 ENCOUNTER — ANTICOAGULATION - OTHER VISIT (DOAC) (OUTPATIENT)
Dept: PHARMACY | Facility: HOSPITAL | Age: 84
End: 2024-11-18

## 2024-11-18 DIAGNOSIS — I48.19 PERSISTENT ATRIAL FIBRILLATION (MULTI): Primary | ICD-10-CM

## 2024-11-18 DIAGNOSIS — E55.9 VITAMIN D DEFICIENCY: ICD-10-CM

## 2024-11-18 DIAGNOSIS — I48.91 ATRIAL FIBRILLATION, UNSPECIFIED TYPE (MULTI): Primary | ICD-10-CM

## 2024-11-18 DIAGNOSIS — R41.3 MEMORY CHANGES: ICD-10-CM

## 2024-11-18 LAB
25(OH)D3 SERPL-MCNC: 23 NG/ML (ref 30–100)
ALBUMIN SERPL BCP-MCNC: 4.2 G/DL (ref 3.4–5)
ALP SERPL-CCNC: 69 U/L (ref 33–136)
ALT SERPL W P-5'-P-CCNC: 9 U/L (ref 7–45)
ANION GAP SERPL CALC-SCNC: 10 MMOL/L (ref 10–20)
AST SERPL W P-5'-P-CCNC: 20 U/L (ref 9–39)
BASOPHILS # BLD AUTO: 0.04 X10*3/UL (ref 0–0.1)
BASOPHILS NFR BLD AUTO: 0.6 %
BILIRUB SERPL-MCNC: 1.1 MG/DL (ref 0–1.2)
BUN SERPL-MCNC: 14 MG/DL (ref 6–23)
CALCIUM SERPL-MCNC: 9.9 MG/DL (ref 8.6–10.3)
CHLORIDE SERPL-SCNC: 95 MMOL/L (ref 98–107)
CO2 SERPL-SCNC: 35 MMOL/L (ref 21–32)
CREAT SERPL-MCNC: 0.65 MG/DL (ref 0.5–1.05)
EGFRCR SERPLBLD CKD-EPI 2021: 87 ML/MIN/1.73M*2
EOSINOPHIL # BLD AUTO: 0.13 X10*3/UL (ref 0–0.4)
EOSINOPHIL NFR BLD AUTO: 2 %
ERYTHROCYTE [DISTWIDTH] IN BLOOD BY AUTOMATED COUNT: 13.7 % (ref 11.5–14.5)
GLUCOSE SERPL-MCNC: 127 MG/DL (ref 74–99)
HCT VFR BLD AUTO: 45.2 % (ref 36–46)
HGB BLD-MCNC: 13.8 G/DL (ref 12–16)
IMM GRANULOCYTES # BLD AUTO: 0.03 X10*3/UL (ref 0–0.5)
IMM GRANULOCYTES NFR BLD AUTO: 0.5 % (ref 0–0.9)
LYMPHOCYTES # BLD AUTO: 1.15 X10*3/UL (ref 0.8–3)
LYMPHOCYTES NFR BLD AUTO: 17.5 %
MCH RBC QN AUTO: 29.5 PG (ref 26–34)
MCHC RBC AUTO-ENTMCNC: 30.5 G/DL (ref 32–36)
MCV RBC AUTO: 97 FL (ref 80–100)
MONOCYTES # BLD AUTO: 0.41 X10*3/UL (ref 0.05–0.8)
MONOCYTES NFR BLD AUTO: 6.2 %
NEUTROPHILS # BLD AUTO: 4.83 X10*3/UL (ref 1.6–5.5)
NEUTROPHILS NFR BLD AUTO: 73.2 %
NRBC BLD-RTO: 0 /100 WBCS (ref 0–0)
PLATELET # BLD AUTO: 149 X10*3/UL (ref 150–450)
POTASSIUM SERPL-SCNC: 4 MMOL/L (ref 3.5–5.3)
PROT SERPL-MCNC: 6.4 G/DL (ref 6.4–8.2)
RBC # BLD AUTO: 4.68 X10*6/UL (ref 4–5.2)
SODIUM SERPL-SCNC: 136 MMOL/L (ref 136–145)
TSH SERPL-ACNC: 2.45 MIU/L (ref 0.44–3.98)
VIT B12 SERPL-MCNC: 449 PG/ML (ref 211–911)
WBC # BLD AUTO: 6.6 X10*3/UL (ref 4.4–11.3)

## 2024-11-18 PROCEDURE — 85025 COMPLETE CBC W/AUTO DIFF WBC: CPT

## 2024-11-18 PROCEDURE — 84443 ASSAY THYROID STIM HORMONE: CPT

## 2024-11-18 PROCEDURE — 36415 COLL VENOUS BLD VENIPUNCTURE: CPT

## 2024-11-18 PROCEDURE — 82306 VITAMIN D 25 HYDROXY: CPT

## 2024-11-18 PROCEDURE — 82607 VITAMIN B-12: CPT

## 2024-11-18 PROCEDURE — 80053 COMPREHEN METABOLIC PANEL: CPT

## 2024-11-18 NOTE — TELEPHONE ENCOUNTER
Pt daughter wants her to see Jesus at [facility] to get her back on Eliquis versus Warfarin; its too hard for patient to get out of house to have PT/INR checked all the time which is in [place]; please place referral, unsure which referral is correct for Jesus to see?

## 2024-11-18 NOTE — PROGRESS NOTES
Pt enrolled in [facility] for management of Persistent atrial fibrillation (Multi) [I48.19].     Pt currently on warfarin through cardio at [facility] (Dr. Kaila Liz), was previously stable on Eliquis but cannot afford. Received referral from PCP, Dr. Mayberry for [facility] to manage warfarin and pass referral onto [facility] for Eliquis assistance review. She has been recommend to take Entresto as well, but cannot afford. Please let Jesus Eaton know of assistance review status. Pt will be maintained on warfarin as managed by [facility] pending assistance review.    We will contact pt daughter to enroll in [facility]. She currently goes to [facility] but wants to change to [facility] for proximity to home    Oleg Eaton, PharmD  P:[phone]  F:[phone]

## 2024-11-19 ENCOUNTER — LAB (OUTPATIENT)
Dept: LAB | Facility: LAB | Age: 84
End: 2024-11-19
Payer: MEDICARE

## 2024-11-19 DIAGNOSIS — R41.3 MEMORY CHANGES: ICD-10-CM

## 2024-11-19 LAB
APPEARANCE UR: ABNORMAL
BACTERIA #/AREA URNS AUTO: ABNORMAL /HPF
BILIRUB UR STRIP.AUTO-MCNC: NEGATIVE MG/DL
COLOR UR: YELLOW
GLUCOSE UR STRIP.AUTO-MCNC: NORMAL MG/DL
KETONES UR STRIP.AUTO-MCNC: NEGATIVE MG/DL
LEUKOCYTE ESTERASE UR QL STRIP.AUTO: ABNORMAL
MUCOUS THREADS #/AREA URNS AUTO: ABNORMAL /LPF
NITRITE UR QL STRIP.AUTO: ABNORMAL
PH UR STRIP.AUTO: 5 [PH]
PROT UR STRIP.AUTO-MCNC: NEGATIVE MG/DL
RBC # UR STRIP.AUTO: NEGATIVE /UL
RBC #/AREA URNS AUTO: ABNORMAL /HPF
SP GR UR STRIP.AUTO: 1.02
SQUAMOUS #/AREA URNS AUTO: ABNORMAL /HPF
UROBILINOGEN UR STRIP.AUTO-MCNC: NORMAL MG/DL
WBC #/AREA URNS AUTO: ABNORMAL /HPF

## 2024-11-19 PROCEDURE — 87086 URINE CULTURE/COLONY COUNT: CPT

## 2024-11-19 PROCEDURE — 87186 SC STD MICRODIL/AGAR DIL: CPT

## 2024-11-19 PROCEDURE — 81001 URINALYSIS AUTO W/SCOPE: CPT

## 2024-11-22 ENCOUNTER — APPOINTMENT (OUTPATIENT)
Dept: PHARMACY | Facility: HOSPITAL | Age: 84
End: 2024-11-22
Payer: MEDICARE

## 2024-11-22 ENCOUNTER — TELEPHONE (OUTPATIENT)
Dept: PRIMARY CARE | Facility: CLINIC | Age: 84
End: 2024-11-22
Payer: MEDICARE

## 2024-11-22 DIAGNOSIS — N30.00 ACUTE CYSTITIS WITHOUT HEMATURIA: Primary | ICD-10-CM

## 2024-11-22 LAB — BACTERIA UR CULT: ABNORMAL

## 2024-11-22 RX ORDER — CIPROFLOXACIN 500 MG/1
500 TABLET ORAL 2 TIMES DAILY
Qty: 10 TABLET | Refills: 0 | Status: SHIPPED | OUTPATIENT
Start: 2024-11-22 | End: 2024-11-27

## 2024-11-25 ENCOUNTER — ANTICOAGULATION - WARFARIN VISIT (OUTPATIENT)
Dept: PHARMACY | Facility: HOSPITAL | Age: 84
End: 2024-11-25
Payer: MEDICARE

## 2024-11-25 DIAGNOSIS — I48.91 ATRIAL FIBRILLATION, UNSPECIFIED TYPE (MULTI): Primary | ICD-10-CM

## 2024-11-25 LAB
POC INR: 1.4
POC PROTHROMBIN TIME: NORMAL

## 2024-11-25 PROCEDURE — 99211 OFF/OP EST MAY X REQ PHY/QHP: CPT

## 2024-11-25 PROCEDURE — 85610 PROTHROMBIN TIME: CPT | Mod: QW

## 2024-11-25 NOTE — PROGRESS NOTES
Pt enrolled in [facility] for management of Atrial fibrillation, unspecified type (Multi) [I48.91].     Pt current location in clinic.     Current anticoagulant: Warfarin        Last Creatinine:   Lab Results   Component Value Date    CREATININE 0.65 11/18/2024     Last hemoglobin/hematocrit:  Lab Results   Component Value Date    HGB 13.8 11/18/2024     Lab Results   Component Value Date    HCT 45.2 11/18/2024       Current INR: 1.4 is SUBtherapeutic for goal range of 2.0-3.0 and is reflective of 25 mg in the previous week prior to visit.    Dosing confirmed with patient  Patient denies any missed doses.  You will on cipro for UTI  Patient denies any adverse reactions or barriers.  Patient denies any CP/SOB, fatigue, bleeding or bruising since last visit.   Patient denies any change in alcohol or tobacco use since last visit.   Patient denies any upcoming medical or dental procedures.    Plan:  Patient was instructed to  Take 5 mg tomorrow then resume 5 mg  MWF and 2.5 mg AOD .  INR follow up will occur in 2 weeks.  Patient was instructed to maintain consistent vegetable intake, to monitor for any bruising or bleeding, and to call with any medication changes or concerns.    Pt handout given with above information    Regis Doherty, DonnieD  P:[phone]  F:[phone]

## 2024-11-29 ENCOUNTER — ANTICOAGULATION - WARFARIN VISIT (OUTPATIENT)
Dept: PHARMACY | Facility: HOSPITAL | Age: 84
End: 2024-11-29
Payer: MEDICARE

## 2024-11-29 DIAGNOSIS — I48.91 ATRIAL FIBRILLATION, UNSPECIFIED TYPE (MULTI): Primary | ICD-10-CM

## 2024-11-29 NOTE — PROGRESS NOTES
Patients Daughter Georgina called asking about bruising on her moms leg that she noticed yesterday. She did state that the bruising looks a lot better today. Patient is supposed to come in Monday (12/2) for INR check. While on the phone she also let me know that she is supposed to have a call to talk about her patient assistance for Entresto in the upcoming week.

## 2024-12-02 ENCOUNTER — APPOINTMENT (OUTPATIENT)
Dept: PHARMACY | Facility: HOSPITAL | Age: 84
End: 2024-12-02
Payer: MEDICARE

## 2024-12-05 ENCOUNTER — APPOINTMENT (OUTPATIENT)
Dept: PHARMACY | Facility: HOSPITAL | Age: 84
End: 2024-12-05
Payer: MEDICARE

## 2024-12-05 ENCOUNTER — TELEMEDICINE (OUTPATIENT)
Dept: PRIMARY CARE | Facility: CLINIC | Age: 84
End: 2024-12-05
Payer: MEDICARE

## 2024-12-05 DIAGNOSIS — J01.10 ACUTE NON-RECURRENT FRONTAL SINUSITIS: Primary | ICD-10-CM

## 2024-12-05 PROCEDURE — 1159F MED LIST DOCD IN RCRD: CPT | Performed by: INTERNAL MEDICINE

## 2024-12-05 PROCEDURE — 99213 OFFICE O/P EST LOW 20 MIN: CPT | Performed by: INTERNAL MEDICINE

## 2024-12-05 PROCEDURE — 1036F TOBACCO NON-USER: CPT | Performed by: INTERNAL MEDICINE

## 2024-12-05 PROCEDURE — 1160F RVW MEDS BY RX/DR IN RCRD: CPT | Performed by: INTERNAL MEDICINE

## 2024-12-05 RX ORDER — FLUCONAZOLE 150 MG/1
150 TABLET ORAL DAILY
Qty: 3 TABLET | Refills: 0 | Status: SHIPPED | OUTPATIENT
Start: 2024-12-05 | End: 2024-12-05 | Stop reason: ALTCHOICE

## 2024-12-05 RX ORDER — NYSTATIN 100000 [USP'U]/ML
500000 SUSPENSION ORAL 4 TIMES DAILY
Qty: 60 ML | Refills: 0 | Status: SHIPPED | OUTPATIENT
Start: 2024-12-05 | End: 2025-02-03

## 2024-12-05 RX ORDER — PREDNISONE 20 MG/1
TABLET ORAL
Qty: 10.5 TABLET | Refills: 0 | Status: SHIPPED | OUTPATIENT
Start: 2024-12-05

## 2024-12-05 RX ORDER — AZITHROMYCIN 500 MG/1
500 TABLET, FILM COATED ORAL DAILY
Qty: 5 TABLET | Refills: 0 | Status: SHIPPED | OUTPATIENT
Start: 2024-12-05 | End: 2024-12-10

## 2024-12-05 ASSESSMENT — ENCOUNTER SYMPTOMS
SHORTNESS OF BREATH: 0
VOICE CHANGE: 0
COUGH: 0
SINUS PAIN: 0
SINUS PRESSURE: 0
SORE THROAT: 1
FEVER: 0
TROUBLE SWALLOWING: 0

## 2024-12-05 NOTE — PROGRESS NOTES
Subjective   Patient ID: Melissa Goodwin is a 84 y.o. female who presents for sick visit.    HPI  Patient is here today for telephone follow up with her daughter.     Patient and physician area at two separate locations.   Pt was verbally consented for the encounter.     Patient thought she had a toothache, she then had a sore throat, headaches in her temples, even her lips hurt, no trouble swallowing. Teeth are sensitive.   No sinus pressure, congestion, fevers.     The only new medication was changing to coumadin.     Visit was transitioned to telephone because she did not want to come to the office in the extreme cold today.     Review of Systems   Constitutional:  Negative for fever.   HENT:  Positive for dental problem and sore throat. Negative for postnasal drip, sinus pressure, sinus pain, sneezing, trouble swallowing and voice change.    Respiratory:  Negative for cough and shortness of breath.    Cardiovascular:  Negative for chest pain and leg swelling.       Objective   There were no vitals taken for this visit.    Physical Exam  No physical exam was completed due to telephone visit.       Assessment/Plan   Problem List Items Addressed This Visit    None  Visit Diagnoses       Acute non-recurrent frontal sinusitis    -  Primary    Relevant Medications    predniSONE (Deltasone) 20 mg tablet    azithromycin (Zithromax) 500 mg tablet    nystatin (Mycostatin) 100,000 unit/mL suspension          Possibly sinus infection, thrush  - not having trouble swallowing, no sob  - daughter saws looks like blisters or sores in her throat, no rash outside of her mouth, teeth sensitivity, has been a whole since she has been to the dentist   -possible sinus infection, tooth infection vs thrush?  - will send azithyomycin, nystatin , prednisone   - call coumadin clinic to let know on antibiotic   - advised if she has trouble swallowing or breathing needs to go to the ED immediately.   Final diagnoses:   [J01.10] Acute  non-recurrent frontal sinusitis

## 2024-12-06 ENCOUNTER — TELEMEDICINE (OUTPATIENT)
Dept: PHARMACY | Facility: HOSPITAL | Age: 84
End: 2024-12-06
Payer: MEDICARE

## 2024-12-06 ENCOUNTER — APPOINTMENT (OUTPATIENT)
Dept: PHARMACY | Facility: HOSPITAL | Age: 84
End: 2024-12-06
Payer: MEDICARE

## 2024-12-06 DIAGNOSIS — I48.19 PERSISTENT ATRIAL FIBRILLATION (MULTI): ICD-10-CM

## 2024-12-06 NOTE — PROGRESS NOTES
"Pharmacist Clinic: Anticoagulation Management  Melissa Goodwin was referred to the Clinical Pharmacy Team for her anticoagulation management.    Referring Provider:  Mindy Mayberry   _______________________________________________________________________  PHARMACY ASSESSMENT    Allergies Reviewed? Yes    Affordability/Accessibility:   - Entresto 24-26 mg: $161.76/month with insurance  - Eliquis 2.5 mg bid: $139.76/month with insurance   Adverse Effects: Patient is currently on warfarin for A.fib for the last few weeks but is having reactions to the medication including sores in her mouth.   - instructed to reach out to cardio regarding side effects.     MEDICATION RECONCILIATION  Added:  - none  Changed:  - Albuterol, Symbicort, Combivent, and Trelegy: patient is on oxygen 24/7 so has only been using her inhalers prn when going outside.   Removed:  - hydrochlorothiazide 25 mg     RELEVANT LAB RESULTS  Lab Results   Component Value Date    BILITOT 1.1 11/18/2024    CALCIUM 9.9 11/18/2024    CO2 35 (H) 11/18/2024    CL 95 (L) 11/18/2024    CREATININE 0.65 11/18/2024    GLUCOSE 127 (H) 11/18/2024    ALKPHOS 69 11/18/2024    K 4.0 11/18/2024    PROT 6.4 11/18/2024     11/18/2024    AST 20 11/18/2024    ALT 9 11/18/2024    BUN 14 11/18/2024    ANIONGAP 10 11/18/2024    MG 2.19 12/27/2023    PHOS 2.7 09/08/2022    ALBUMIN 4.2 11/18/2024    GFRF 87 08/15/2023     Lab Results   Component Value Date    TRIG 33 08/15/2023    CHOL 98 08/15/2023    HDL 72.0 08/15/2023     No results found for: \"BMCBC\", \"CBCDIF\"     DRUG INTERACTIONS  - No  _______________________________________________________________________  ANTICOAGULATION ASSESSMENT    The ASCVD Risk score (Veronica RICKETTS, et al., 2019) failed to calculate for the following reasons:    The 2019 ASCVD risk score is only valid for ages 40 to 79    Risk score cannot be calculated because patient has a medical history suggesting prior/existing ASCVD    DIAGNOSIS: " prevention of nonvalvular atrial fibrilliation stroke and systemic embolism  - Patient is projected to be on anticoagulation indefinitely   - [id number] Score: [6] (only included if diagnosis is atrial fibrillation)   Age: [<65 (0)] [65-74 (+1)] [> 75 (+2)]: 2  Sex: [Male/Female (+1)]: 1  CHF history: [No/Yes(+1)]: 1  Hypertension history: [No/Yes(+1)]: 1  Stroke/TIA/thromboembolism history: [No/Yes(+2)]: 0  Vascular disease history (prior MI, peripheral artery disease, aortic plaque): [No/Yes(+1)]: 1  Diabetes history: [No/Yes(+1)]: 0    CURRENT PHARMACOTHERAPY:   - Warfarin 5 mg   - Losartan 25 mg   - Atorvastatin 40 mg   - ASA 81 mg   - Spironolactone 25 mg      Patient Assistance Program (PAP)    Application for program to be submitted for the following medications: [facility] Permanent Address: [place]    Prescription Insurance:   Yes   Members of Household: 1   Files Taxes: Yes       Patient will be email financial information to pharmacist directly at [email or web address].    Patient verbally reports monthly or yearly income which is less than 400% federal poverty level    Patient aware this process may take up to 6 weeks.     If approved medication must be filled through [facility] and MEDICATION WILL BE MAILED TO PATIENT.  _______________________________________________________________________  PATIENT EDUCATION/GOALS  - Counseled patient on MOA, expectations, duration of therapy, contraindications, administration, and monitoring parameters  - Counseled patient of side effects that are indicative of bleeding such as dark tarry stool, unexplainable bruising, or vomiting up a coffee ground like substance  - Answered all patient questions and concerns  _____________________________________________________________________  RECOMMENDATIONS/PLAN  - Discussed [facility] PAP program for Eliquis and Entresto coverage.   - Patient's daughter will be emailing [phone] tax document.      PLAN:  1. Continue Warfarin 5 mg daily   2. Continue all other medications.     Follow up with Clinical Pharmacy Team 12/20/24 at 1 pm     Time spent with pt: Total length of time 30 (minutes) of the encounter and more than 50% was spent counseling the patient.    Continue all meds under the continuation of care with the referring provider and clinical pharmacy team.    Please reach out to the Clinical Pharmacy Team if there are any further questions.     Verbal consent to manage patient's drug therapy was obtained from an individual authorized to act on behalf of a patient (danial Martinez). They were informed they may decline to participate or withdraw from participation in pharmacy services at any time.    Merary Cornejo, PharmD  PGY-1 Pharmacy Resident  [phone]

## 2024-12-09 ENCOUNTER — TELEPHONE (OUTPATIENT)
Dept: PRIMARY CARE | Facility: CLINIC | Age: 84
End: 2024-12-09
Payer: MEDICARE

## 2024-12-09 DIAGNOSIS — B02.8 HERPES ZOSTER WITH COMPLICATION: Primary | ICD-10-CM

## 2024-12-09 RX ORDER — TRAMADOL HYDROCHLORIDE 50 MG/1
50 TABLET ORAL EVERY 8 HOURS PRN
Qty: 20 TABLET | Refills: 0 | Status: SHIPPED | OUTPATIENT
Start: 2024-12-09

## 2024-12-09 NOTE — TELEPHONE ENCOUNTER
Patient in a lot of pain from her mouth shingles; patients daughter asking for something for pain for her mother; looks like she is allergic to a lot of medications for pain.

## 2024-12-17 ENCOUNTER — TELEPHONE (OUTPATIENT)
Dept: PHARMACY | Facility: HOSPITAL | Age: 84
End: 2024-12-17
Payer: MEDICARE

## 2024-12-17 DIAGNOSIS — I48.19 PERSISTENT ATRIAL FIBRILLATION (MULTI): Primary | ICD-10-CM

## 2024-12-17 DIAGNOSIS — I50.33 CHF (CONGESTIVE HEART FAILURE), NYHA CLASS I, ACUTE ON CHRONIC, DIASTOLIC: ICD-10-CM

## 2024-12-17 PROCEDURE — RXMED WILLOW AMBULATORY MEDICATION CHARGE

## 2024-12-17 RX ORDER — SACUBITRIL AND VALSARTAN 24; 26 MG/1; MG/1
1 TABLET, FILM COATED ORAL 2 TIMES DAILY
Qty: 60 TABLET | Refills: 2 | Status: SHIPPED | OUTPATIENT
Start: 2024-12-17

## 2024-12-17 NOTE — TELEPHONE ENCOUNTER
Patient Assistance Program Approval:     We are pleased to inform you that your application for assistance has been approved.     This approval is valid through  December 17, 2025  as long as the following criteria continue to be satisfied:     Your medication (Eliquis 2.5 mg and Entresto 24-26 mg) remains covered under your current insurance plan.   Your prescriber does not discontinue therapy.   You do not seek reimbursement from any other private or government-funded programs for the  medication.    Under this program, the pharmacy will first bill your insurance plan for your indemnified specified medication. The [employer] Fund will then offset your copay balance, so that your out-of pocket expense for your specialty medication will be $0.00.    Counseled patient's daughter on discontinuing losartan prior to Entresto initiation. Daughter states she had received samples of Eliquis and Entresto but had not discontinued losartan before starting Entresto. She had discussed with the her pcp to discontinue warfarin and start Eliquis however. Education on side effects and drug interactions was provided and we went over her medications. Daughter verbalized understanding to no longer administer losartan and warfarin.     Merary Cornejo, PharmD

## 2024-12-18 ENCOUNTER — TELEPHONE (OUTPATIENT)
Dept: PRIMARY CARE | Facility: CLINIC | Age: 84
End: 2024-12-18
Payer: MEDICARE

## 2024-12-18 DIAGNOSIS — B02.9 HERPES ZOSTER WITHOUT COMPLICATION: Primary | ICD-10-CM

## 2024-12-18 RX ORDER — GABAPENTIN 300 MG/1
300 CAPSULE ORAL 3 TIMES DAILY
Qty: 90 CAPSULE | Refills: 5 | Status: SHIPPED | OUTPATIENT
Start: 2024-12-18 | End: 2025-06-16

## 2024-12-18 NOTE — TELEPHONE ENCOUNTER
PTS daughter called with concerns that her mother is still in a lot of pain even on the Tramadol. They didn't know if this medication could be increased?

## 2024-12-19 DIAGNOSIS — B02.9 HERPES ZOSTER WITHOUT COMPLICATION: ICD-10-CM

## 2024-12-19 DIAGNOSIS — J01.10 ACUTE NON-RECURRENT FRONTAL SINUSITIS: ICD-10-CM

## 2024-12-19 RX ORDER — ACYCLOVIR 400 MG/1
800 TABLET ORAL 2 TIMES DAILY
COMMUNITY
Start: 2024-12-08 | End: 2024-12-19 | Stop reason: SDUPTHER

## 2024-12-19 RX ORDER — ACYCLOVIR 400 MG/1
800 TABLET ORAL 2 TIMES DAILY
Qty: 40 TABLET | Refills: 0 | Status: SHIPPED | OUTPATIENT
Start: 2024-12-19 | End: 2024-12-29

## 2024-12-19 RX ORDER — NYSTATIN 100000 [USP'U]/ML
500000 SUSPENSION ORAL 4 TIMES DAILY
Qty: 180 ML | Refills: 0 | Status: SHIPPED | OUTPATIENT
Start: 2024-12-19 | End: 2024-12-28

## 2024-12-19 RX ORDER — NYSTATIN 100000 [USP'U]/ML
500000 SUSPENSION ORAL 4 TIMES DAILY
Qty: 60 ML | Refills: 2 | Status: SHIPPED | OUTPATIENT
Start: 2024-12-19 | End: 2024-12-19 | Stop reason: SDUPTHER

## 2024-12-20 ENCOUNTER — APPOINTMENT (OUTPATIENT)
Dept: PHARMACY | Facility: HOSPITAL | Age: 84
End: 2024-12-20
Payer: MEDICARE

## 2024-12-20 ENCOUNTER — PHARMACY VISIT (OUTPATIENT)
Dept: PHARMACY | Facility: CLINIC | Age: 84
End: 2024-12-20
Payer: COMMERCIAL

## 2024-12-23 DIAGNOSIS — B02.8 HERPES ZOSTER WITH COMPLICATION: ICD-10-CM

## 2024-12-23 RX ORDER — TRAMADOL HYDROCHLORIDE 50 MG/1
100 TABLET ORAL EVERY 8 HOURS PRN
Qty: 30 TABLET | Refills: 0 | Status: SHIPPED | OUTPATIENT
Start: 2024-12-23

## 2024-12-27 NOTE — TELEPHONE ENCOUNTER
I reviewed the progress note and agree with the resident's findings and plans as written.  Case discussed with resident.    Geneva Simpson, PharmD

## 2025-01-07 ENCOUNTER — APPOINTMENT (OUTPATIENT)
Dept: PRIMARY CARE | Facility: CLINIC | Age: 85
End: 2025-01-07
Payer: MEDICARE

## 2025-01-07 VITALS
HEART RATE: 76 BPM | BODY MASS INDEX: 21.4 KG/M2 | SYSTOLIC BLOOD PRESSURE: 102 MMHG | HEIGHT: 62 IN | DIASTOLIC BLOOD PRESSURE: 67 MMHG

## 2025-01-07 DIAGNOSIS — H65.21 RIGHT CHRONIC SEROUS OTITIS MEDIA: Primary | ICD-10-CM

## 2025-01-07 DIAGNOSIS — B02.8 HERPES ZOSTER WITH COMPLICATION: ICD-10-CM

## 2025-01-07 PROCEDURE — 1160F RVW MEDS BY RX/DR IN RCRD: CPT | Performed by: INTERNAL MEDICINE

## 2025-01-07 PROCEDURE — 1159F MED LIST DOCD IN RCRD: CPT | Performed by: INTERNAL MEDICINE

## 2025-01-07 PROCEDURE — 3078F DIAST BP <80 MM HG: CPT | Performed by: INTERNAL MEDICINE

## 2025-01-07 PROCEDURE — 99213 OFFICE O/P EST LOW 20 MIN: CPT | Performed by: INTERNAL MEDICINE

## 2025-01-07 PROCEDURE — 1036F TOBACCO NON-USER: CPT | Performed by: INTERNAL MEDICINE

## 2025-01-07 PROCEDURE — 3074F SYST BP LT 130 MM HG: CPT | Performed by: INTERNAL MEDICINE

## 2025-01-07 RX ORDER — TRAMADOL HYDROCHLORIDE 50 MG/1
100 TABLET ORAL EVERY 8 HOURS PRN
Qty: 30 TABLET | Refills: 0 | Status: SHIPPED | OUTPATIENT
Start: 2025-01-07 | End: 2025-01-07 | Stop reason: SDUPTHER

## 2025-01-07 RX ORDER — AZITHROMYCIN 500 MG/1
500 TABLET, FILM COATED ORAL DAILY
Qty: 5 TABLET | Refills: 0 | Status: SHIPPED | OUTPATIENT
Start: 2025-01-07 | End: 2025-01-12

## 2025-01-07 RX ORDER — NEOMYCIN SULFATE, POLYMYXIN B SULFATE, HYDROCORTISONE 3.5; 10000; 1 MG/ML; [USP'U]/ML; MG/ML
2 SOLUTION/ DROPS AURICULAR (OTIC) 4 TIMES DAILY
Qty: 10 ML | Refills: 0 | Status: SHIPPED | OUTPATIENT
Start: 2025-01-07

## 2025-01-07 RX ORDER — TRAMADOL HYDROCHLORIDE 50 MG/1
100 TABLET ORAL EVERY 8 HOURS PRN
Qty: 30 TABLET | Refills: 0 | Status: SHIPPED | OUTPATIENT
Start: 2025-01-07

## 2025-01-07 ASSESSMENT — ENCOUNTER SYMPTOMS: SORE THROAT: 0

## 2025-01-07 NOTE — PROGRESS NOTES
"Subjective   Patient ID: Melissa Goodwin is a 84 y.o. female who presents for Medicare Annual Wellness Visit Subsequent (+6 week follow up/+Pains in RT ear and in head; post shingles dx).  HPI  Patient is here today with her daughter with a chief complaint of right ear pain and facial pain.    Pt had shingles in her mouth and trigeminal nerve area about 4 weeks ago.   Was treated with antiviral.  Continues to have sharp stabbing pain.   Reports right ear plugged and pain in it.     Oxygen is running out during her appt so will forgo MWV.     Review of Systems   HENT:  Positive for ear pain and mouth sores. Negative for sore throat.        Objective   /67   Pulse 76   Ht 1.575 m (5' 2\")   BMI 21.40 kg/m²     Physical Exam  Constitutional:       General: She is not in acute distress.     Appearance: Normal appearance. She is not toxic-appearing.      Comments: In wheelchair, wearing oxygen    HENT:      Head: Normocephalic and atraumatic.      Right Ear: External ear normal.      Left Ear: Tympanic membrane, ear canal and external ear normal.      Ears:      Comments: Right cerumen impaction with reddness around the ear canal, do not see active blistering     Nose: Nose normal.      Mouth/Throat:      Mouth: Mucous membranes are moist.      Pharynx: Oropharynx is clear.      Comments: Resolving mouth ulcerations  Eyes:      Extraocular Movements: Extraocular movements intact.      Conjunctiva/sclera: Conjunctivae normal.      Pupils: Pupils are equal, round, and reactive to light.   Cardiovascular:      Rate and Rhythm: Normal rate and regular rhythm.      Heart sounds: No murmur heard.     No friction rub. No gallop.   Pulmonary:      Effort: Pulmonary effort is normal.      Breath sounds: Normal breath sounds.   Musculoskeletal:         General: No swelling. Normal range of motion.      Cervical back: Normal range of motion.   Skin:     General: Skin is warm and dry.   Neurological:      General: No focal " deficit present.      Mental Status: She is alert and oriented to person, place, and time.   Psychiatric:         Mood and Affect: Mood normal.         Thought Content: Thought content normal.           Assessment/Plan   Problem List Items Addressed This Visit    None  Visit Diagnoses       Right chronic serous otitis media    -  Primary    Relevant Medications    azithromycin (Zithromax) 500 mg tablet    neomycin-polymyxin-HC (Cortisporin) otic solution    Herpes zoster with complication        Relevant Medications    traMADol (Ultram) 50 mg tablet          Sent azithromycin and ear drops   Recommend waiting another 4 weeks and then getting first shingles shot as she is likely to get it again and has not had any shingles vaccines in the past   Explained that shingles can lead to chronic nerve pain  - refill tramadol.    -I have personally reviewed this patient's OARRS report and found it to be appropriate. This has marie uploaded into the medical record. I have considered the risks of abuse, addiction, dependency and diversion and feel that it is clinically appropriate for this patient to be prescribed this controlled substance medication.     Pt and daughter aware that I will be leaving  at the end of Feb 2025.   Final diagnoses:   [B02.8] Herpes zoster with complication   [H65.21] Right chronic serous otitis media

## 2025-01-17 ENCOUNTER — APPOINTMENT (OUTPATIENT)
Dept: PHARMACY | Facility: HOSPITAL | Age: 85
End: 2025-01-17
Payer: MEDICARE

## 2025-01-20 NOTE — PROGRESS NOTES
Pharmacist Clinic: Anticoagulation Management  Melissa Goodwin was referred to the Clinical Pharmacy Team for her anticoagulation management.    Referring Provider:  Mindy Mayberry   _______________________________________________________________________  PHARMACY ASSESSMENT    Allergies Reviewed? Yes    Affordability/Accessibility:  PAP approved through 12/17/25  - Entresto 24-26 mg: $161.76/month with insurance  - Eliquis 2.5 mg bid: $139.76/month with insurance   - no issues with delivery  Adherence/Compliance: uses pill container  Adverse Effects: None    MEDICATION RECONCILIATION  Added:  - none  Changed:  - Albuterol, Symbicort, Combivent, and Trelegy: patient is on oxygen 24/7 so has only been using her inhalers prn when going outside.   Removed:  - hydrochlorothiazide 25 mg     CHF ASSESSMENT     Symptom/Staging:  -Most recent ejection fraction: 40%  -NYHA Stage: I    Guideline-Directed Medical Therapy:  -ARNI: Yes, describe: Entresto 24-26 mg \  -Beta Blocker: No, patient was on metoprolol but it caused dizziness/lightheadedness  -MRA: Yes, describe: Spironolactone 25 mg  -SGLT2i: No    Secondary Prevention:  -The ASCVD Risk score (Veronica DK, et al., 2019) failed to calculate for the following reasons:    The 2019 ASCVD risk score is only valid for ages 40 to 79    Risk score cannot be calculated because patient has a medical history suggesting prior/existing ASCVD   -Aspirin 81mg? yes  -Statin?: Yes, describe: Atorvastatin  -HTN?: Yes, describe: 102/67 mmHg (1/7/25)    Monitoring Weights at Home: No. Discussed monitoring weight daily.  Home Weight Recordings: 112 lbs last weigh in    Past In Office Weight Readings:   Wt Readings from Last 6 Encounters:   11/05/24 53.1 kg (117 lb)   03/01/24 50.3 kg (111 lb)   12/28/23 53 kg (116 lb 13.5 oz)   10/12/23 52 kg (114 lb 10.2 oz)   10/11/23 51.7 kg (114 lb)   09/21/23 52.2 kg (115 lb)     Home BP: does not monitor or document regularly  Last BP: ~118/70's  "mmHg    Past In Office BP Readings:   BP Readings from Last 6 Encounters:   01/07/25 102/67   11/05/24 138/75   03/01/24 126/85   12/28/23 135/88   10/12/23 138/81   09/21/23 127/80       HEALTH MANAGEMENT    Maintaining fluid restriction (<2 L/day): N/A  Edema/swelling: No  Shortness of breath: Yes, patient has COPD and is on oxygen  Trouble sleeping/lying down: Yes  Dry/hacking cough: Yes  Recent Hospitalizations: Yes, describe: shingles (12/7/24)    EDUCATION/COUNSELING:   - Counseled patient on MOA, expectations, duration of therapy, contraindications, administration, and monitoring parameters  - Counseled patient on lifestyle modifications that can decrease your risk of having complications (smoking cessation, losing weight, daily weights, vaccines)  - Counseled patient on fluid intake and weight management. Recommended to not consume more than 2 liters of fliuids per day. If they have gained more than 2-3 pounds within a 24 hours period (or 5 pounds in a week), contact their cardiologist  - Answered all patient questions and concerns   RELEVANT LAB RESULTS  Lab Results   Component Value Date    BILITOT 1.1 11/18/2024    CALCIUM 9.9 11/18/2024    CO2 35 (H) 11/18/2024    CL 95 (L) 11/18/2024    CREATININE 0.65 11/18/2024    GLUCOSE 127 (H) 11/18/2024    ALKPHOS 69 11/18/2024    K 4.0 11/18/2024    PROT 6.4 11/18/2024     11/18/2024    AST 20 11/18/2024    ALT 9 11/18/2024    BUN 14 11/18/2024    ANIONGAP 10 11/18/2024    MG 2.19 12/27/2023    PHOS 2.7 09/08/2022    ALBUMIN 4.2 11/18/2024    GFRF 87 08/15/2023     Lab Results   Component Value Date    TRIG 33 08/15/2023    CHOL 98 08/15/2023    HDL 72.0 08/15/2023     No results found for: \"BMCBC\", \"CBCDIF\"     DRUG INTERACTIONS  - No  _______________________________________________________________________  ANTICOAGULATION ASSESSMENT    The ASCVD Risk score (Veronica RICKETTS, et al., 2019) failed to calculate for the following reasons:    The 2019 ASCVD risk " score is only valid for ages 40 to 79    Risk score cannot be calculated because patient has a medical history suggesting prior/existing ASCVD    DIAGNOSIS: prevention of nonvalvular atrial fibrilliation stroke and systemic embolism  - Patient is projected to be on anticoagulation indefinitely   - [id number] Score: [6] (only included if diagnosis is atrial fibrillation)   Age: [<65 (0)] [65-74 (+1)] [> 75 (+2)]: 2  Sex: [Male/Female (+1)]: 1  CHF history: [No/Yes(+1)]: 1  Hypertension history: [No/Yes(+1)]: 1  Stroke/TIA/thromboembolism history: [No/Yes(+2)]: 0  Vascular disease history (prior MI, peripheral artery disease, aortic plaque): [No/Yes(+1)]: 1  Diabetes history: [No/Yes(+1)]: 0    CURRENT PHARMACOTHERAPY:   - Eliquis 2.5 mg bid   - Entresto 24-26 mg bid    - Atorvastatin 40 mg   - ASA 81 mg   - Spironolactone 25 mg   _______________  PATIENT EDUCATION/GOALS  - Counseled patient on MOA, expectations, duration of therapy, contraindications, administration, and monitoring parameters  - Counseled patient of side effects that are indicative of bleeding such as dark tarry stool, unexplainable bruising, or vomiting up a coffee ground like substance  - Answered all patient questions and concerns  _____________________________________________________________________  RECOMMENDATIONS/PLAN  - Discussed appropriate BP technique including checking BP prior to any caffeine, sitting for at least 5 minutes, prop arm on a table at heart's level, keep feet flat on the floor and back against the chair and no talking while taking BP. Daughter will document BP for next appointment.   - Patient is tolerating Entresto and Eliquis and denies any hypotension, falls or bleed events.   - Discussed monitoring weight daily to identify if patient is retaining any fluid. Daughter agreed to plan      PLAN:  1. Continue Eliquis 2.5 mg twice daily   2. Continue Entresto 24-26 mg twice daily   3. Continue all other medications.   4.  Monitor BP and daily weight    Follow up with Clinical Pharmacy Team 2/25/25 at 1 pm    Time spent with pt: Total length of time 20 (minutes) of the encounter and more than 50% was spent counseling the patient.    Continue all meds under the continuation of care with the referring provider and clinical pharmacy team.    Please reach out to the Clinical Pharmacy Team if there are any further questions.     Verbal consent to manage patient's drug therapy was obtained from patient. They were informed they may decline to participate or withdraw from participation in pharmacy services at any time.    Merary Cornejo, PharmD  PGY-1 Pharmacy Resident  [phone]

## 2025-01-21 ENCOUNTER — APPOINTMENT (OUTPATIENT)
Dept: PHARMACY | Facility: HOSPITAL | Age: 85
End: 2025-01-21
Payer: MEDICARE

## 2025-01-21 DIAGNOSIS — B02.9 HERPES ZOSTER WITHOUT COMPLICATION: ICD-10-CM

## 2025-01-21 DIAGNOSIS — B02.8 HERPES ZOSTER WITH COMPLICATION: ICD-10-CM

## 2025-01-21 DIAGNOSIS — I48.19 PERSISTENT ATRIAL FIBRILLATION (MULTI): ICD-10-CM

## 2025-01-21 RX ORDER — GABAPENTIN 300 MG/1
300 CAPSULE ORAL 3 TIMES DAILY
Qty: 90 CAPSULE | Refills: 5 | Status: SHIPPED | OUTPATIENT
Start: 2025-01-21 | End: 2025-07-20

## 2025-01-21 RX ORDER — TRAMADOL HYDROCHLORIDE 50 MG/1
100 TABLET ORAL EVERY 8 HOURS PRN
Qty: 30 TABLET | Refills: 0 | Status: SHIPPED | OUTPATIENT
Start: 2025-01-21

## 2025-01-21 NOTE — PROGRESS NOTES
I reviewed the progress note and agree with the resident’s findings and plans as written. Case discussed with resident.    Sydnie Mcfarland, PharmD  Clinical Pharmacy Specialist   [phone]

## 2025-01-22 DIAGNOSIS — G43.909 MIGRAINE WITHOUT STATUS MIGRAINOSUS, NOT INTRACTABLE, UNSPECIFIED MIGRAINE TYPE: ICD-10-CM

## 2025-01-22 RX ORDER — AMITRIPTYLINE HYDROCHLORIDE 25 MG/1
25 TABLET, FILM COATED ORAL NIGHTLY
Qty: 90 TABLET | Refills: 3 | Status: SHIPPED | OUTPATIENT
Start: 2025-01-22 | End: 2026-01-22

## 2025-02-11 PROCEDURE — RXMED WILLOW AMBULATORY MEDICATION CHARGE

## 2025-02-13 ENCOUNTER — PHARMACY VISIT (OUTPATIENT)
Dept: PHARMACY | Facility: CLINIC | Age: 85
End: 2025-02-13
Payer: COMMERCIAL

## 2025-02-25 ENCOUNTER — APPOINTMENT (OUTPATIENT)
Dept: PHARMACY | Facility: HOSPITAL | Age: 85
End: 2025-02-25
Payer: MEDICARE

## 2025-02-25 DIAGNOSIS — I48.19 PERSISTENT ATRIAL FIBRILLATION (MULTI): ICD-10-CM

## 2025-02-25 DIAGNOSIS — I50.33 CHF (CONGESTIVE HEART FAILURE), NYHA CLASS I, ACUTE ON CHRONIC, DIASTOLIC: Primary | ICD-10-CM

## 2025-02-25 RX ORDER — SACUBITRIL AND VALSARTAN 24; 26 MG/1; MG/1
1 TABLET, FILM COATED ORAL 2 TIMES DAILY
Qty: 180 TABLET | Refills: 3 | Status: SHIPPED | OUTPATIENT
Start: 2025-02-25

## 2025-02-25 NOTE — PROGRESS NOTES
Pharmacist Clinic: Anticoagulation Management  Melissa Goodwin was referred to the Clinical Pharmacy Team for her anticoagulation management.    Referring Provider:  Mindy Mayberry   _______________________________________________________________________  PHARMACY ASSESSMENT    Allergies Reviewed? Yes    Affordability/Accessibility:  PAP approved through 12/17/25  - Entresto 24-26 mg: $161.76/month with insurance  - Eliquis 2.5 mg bid: $139.76/month with insurance   - no issues with delivery  Adherence/Compliance: uses pill container and denies missing any doses  Adverse Effects: None    CHF ASSESSMENT     Symptom/Staging:  -Most recent ejection fraction: 40%  -NYHA Stage: I    Guideline-Directed Medical Therapy:  -ARNI: Yes, describe: Entresto 24-26 mg  -Beta Blocker: No, patient was on metoprolol but it caused dizziness/lightheadedness  -MRA: Yes, describe: Spironolactone 25 mg  -SGLT2i: No    Secondary Prevention:  -The ASCVD Risk score (Veronica DK, et al., 2019) failed to calculate for the following reasons:    The 2019 ASCVD risk score is only valid for ages 40 to 79    Risk score cannot be calculated because patient has a medical history suggesting prior/existing ASCVD   -Aspirin 81mg? yes  -Statin?: Yes, describe: Atorvastatin  -HTN?: Yes, describe: 102/67 mmHg (1/7/25)    Monitoring Weights at Home: Yes.   Home Weight Recordings: 112 lbs last weigh in    Past In Office Weight Readings:   Wt Readings from Last 6 Encounters:   11/05/24 53.1 kg (117 lb)   03/01/24 50.3 kg (111 lb)   12/28/23 53 kg (116 lb 13.5 oz)   10/12/23 52 kg (114 lb 10.2 oz)   10/11/23 51.7 kg (114 lb)   09/21/23 52.2 kg (115 lb)     Home BP:   105/71 mmHg  98/69 mmHg  99/70 mmHg  111/73 mmHg  98/78 mmHg    Past In Office BP Readings:   BP Readings from Last 6 Encounters:   01/07/25 102/67   11/05/24 138/75   03/01/24 126/85   12/28/23 135/88   10/12/23 138/81   09/21/23 127/80       HEALTH MANAGEMENT    Maintaining fluid restriction  "(<2 L/day): N/A  Edema/swelling: No  Shortness of breath: Yes, patient has COPD and is on oxygen  Trouble sleeping/lying down: Yes  Dry/hacking cough: Yes  Recent Hospitalizations: Yes, describe: shingles (12/7/24)    EDUCATION/COUNSELING:   - Counseled patient on MOA, expectations, duration of therapy, contraindications, administration, and monitoring parameters  - Counseled patient on lifestyle modifications that can decrease your risk of having complications (smoking cessation, losing weight, daily weights, vaccines)  - Counseled patient on fluid intake and weight management. Recommended to not consume more than 2 liters of fliuids per day. If they have gained more than 2-3 pounds within a 24 hours period (or 5 pounds in a week), contact their cardiologist  - Answered all patient questions and concerns   RELEVANT LAB RESULTS  Lab Results   Component Value Date    BILITOT 1.1 11/18/2024    CALCIUM 9.9 11/18/2024    CO2 35 (H) 11/18/2024    CL 95 (L) 11/18/2024    CREATININE 0.65 11/18/2024    GLUCOSE 127 (H) 11/18/2024    ALKPHOS 69 11/18/2024    K 4.0 11/18/2024    PROT 6.4 11/18/2024     11/18/2024    AST 20 11/18/2024    ALT 9 11/18/2024    BUN 14 11/18/2024    ANIONGAP 10 11/18/2024    MG 2.19 12/27/2023    PHOS 2.7 09/08/2022    ALBUMIN 4.2 11/18/2024    GFRF 87 08/15/2023     Lab Results   Component Value Date    TRIG 33 08/15/2023    CHOL 98 08/15/2023    HDL 72.0 08/15/2023     No results found for: \"BMCBC\", \"CBCDIF\"     DRUG INTERACTIONS  - No  _______________________________________________________________________  ANTICOAGULATION ASSESSMENT    The ASCVD Risk score (Veronica RICKETTS, et al., 2019) failed to calculate for the following reasons:    The 2019 ASCVD risk score is only valid for ages 40 to 79    Risk score cannot be calculated because patient has a medical history suggesting prior/existing ASCVD    DIAGNOSIS: prevention of nonvalvular atrial fibrilliation stroke and systemic embolism  - Patient " is projected to be on anticoagulation indefinitely   - [id number] Score: [6] (only included if diagnosis is atrial fibrillation)   Age: [<65 (0)] [65-74 (+1)] [> 75 (+2)]: 2  Sex: [Male/Female (+1)]: 1  CHF history: [No/Yes(+1)]: 1  Hypertension history: [No/Yes(+1)]: 1  Stroke/TIA/thromboembolism history: [No/Yes(+2)]: 0  Vascular disease history (prior MI, peripheral artery disease, aortic plaque): [No/Yes(+1)]: 1  Diabetes history: [No/Yes(+1)]: 0    CURRENT PHARMACOTHERAPY:   - Eliquis 2.5 mg bid   - Entresto 24-26 mg bid    - Atorvastatin 40 mg   - ASA 81 mg   - Spironolactone 25 mg   _______________  PATIENT EDUCATION/GOALS  - Counseled patient on MOA, expectations, duration of therapy, contraindications, administration, and monitoring parameters  - Counseled patient of side effects that are indicative of bleeding such as dark tarry stool, unexplainable bruising, or vomiting up a coffee ground like substance  - Answered all patient questions and concerns  _____________________________________________________________________  RECOMMENDATIONS/PLAN  - Patient recently started palliative care and her pcp will be leaving  in the next 1-2 months.   - Patient's daughter will discuss with palliative care if a new pcp is required but for now follow up with clinical pharmacy will be deferred.   - Patient is tolerating both Eliquis and Entresto. She denies any bleeding, bruising, dizziness, lightheadedness or blurred vision.   - Daughter has been monitor BP and daily weights which have been consistent.  - Will continue medications at current dose.     PLAN:  1. Continue Eliquis 2.5 mg twice daily   2. Continue Entresto 24-26 mg twice daily    - Sent new scripts for 90 days supply for Eliquis/Entresto to [facility]   3. Continue all other medications.   4. Monitor BP and daily weight  5. Call clinical pharmacist at [phone] with any questions    Follow up with Clinical Pharmacy Team if needed    Time spent  with pt: Total length of time 10 (minutes) of the encounter and more than 50% was spent counseling the patient.    Continue all meds under the continuation of care with the referring provider and clinical pharmacy team.    Please reach out to the Clinical Pharmacy Team if there are any further questions.     Verbal consent to manage patient's drug therapy was obtained from patient. They were informed they may decline to participate or withdraw from participation in pharmacy services at any time.    Merary Cornejo, PharmD  PGY-1 Pharmacy Resident  [phone]

## 2025-02-25 NOTE — PROGRESS NOTES
I reviewed the progress note and agree with the resident’s findings and plans as written. Case discussed with resident.    Sydnie Mcfarland, PharmD  Clinical Pharmacy Specialist   [phone]

## 2025-03-17 ENCOUNTER — TELEPHONE (OUTPATIENT)
Dept: PRIMARY CARE | Facility: CLINIC | Age: 85
End: 2025-03-17
Payer: MEDICARE

## 2025-03-17 NOTE — TELEPHONE ENCOUNTER
Daughter called stating patient needs a referral to hospice in [place]. She has only seen Dr. SEGUNDO. I wasn't sure if Rosalinda could do it or let her know what to do. [phone]

## 2025-03-20 PROCEDURE — RXMED WILLOW AMBULATORY MEDICATION CHARGE

## 2025-03-21 ENCOUNTER — PHARMACY VISIT (OUTPATIENT)
Dept: PHARMACY | Facility: CLINIC | Age: 85
End: 2025-03-21
Payer: COMMERCIAL

## (undated) DEVICE — Device